# Patient Record
Sex: FEMALE | Race: BLACK OR AFRICAN AMERICAN | Employment: FULL TIME | URBAN - METROPOLITAN AREA
[De-identification: names, ages, dates, MRNs, and addresses within clinical notes are randomized per-mention and may not be internally consistent; named-entity substitution may affect disease eponyms.]

---

## 2019-03-22 ENCOUNTER — OFFICE VISIT (OUTPATIENT)
Dept: FAMILY MEDICINE CLINIC | Age: 59
End: 2019-03-22

## 2019-03-22 ENCOUNTER — HOSPITAL ENCOUNTER (OUTPATIENT)
Dept: LAB | Age: 59
Discharge: HOME | End: 2019-03-22

## 2019-03-22 VITALS
HEART RATE: 77 BPM | DIASTOLIC BLOOD PRESSURE: 84 MMHG | SYSTOLIC BLOOD PRESSURE: 144 MMHG | BODY MASS INDEX: 36.49 KG/M2 | HEIGHT: 65 IN | TEMPERATURE: 98.5 F | WEIGHT: 219 LBS

## 2019-03-22 DIAGNOSIS — E11.9 TYPE 2 DIABETES MELLITUS WITHOUT COMPLICATION, WITHOUT LONG-TERM CURRENT USE OF INSULIN (HCC): Primary | ICD-10-CM

## 2019-03-22 DIAGNOSIS — G56.03 BILATERAL CARPAL TUNNEL SYNDROME: ICD-10-CM

## 2019-03-22 DIAGNOSIS — I10 ESSENTIAL HYPERTENSION: ICD-10-CM

## 2019-03-22 DIAGNOSIS — Z13.9 ENCOUNTER FOR SCREENING: ICD-10-CM

## 2019-03-22 DIAGNOSIS — R07.9 CHEST PAIN OF UNCERTAIN ETIOLOGY: ICD-10-CM

## 2019-03-22 DIAGNOSIS — E11.9 TYPE 2 DIABETES MELLITUS WITHOUT COMPLICATION, WITHOUT LONG-TERM CURRENT USE OF INSULIN (HCC): ICD-10-CM

## 2019-03-22 PROBLEM — E66.01 SEVERE OBESITY (HCC): Status: ACTIVE | Noted: 2019-03-22

## 2019-03-22 LAB
ALBUMIN SERPL-MCNC: 3.8 G/DL (ref 3.5–5)
ALBUMIN/GLOB SERPL: 1.3 {RATIO} (ref 1.1–2.2)
ALP SERPL-CCNC: 141 U/L (ref 45–117)
ALT SERPL-CCNC: 22 U/L (ref 12–78)
ANION GAP SERPL CALC-SCNC: 7 MMOL/L (ref 5–15)
AST SERPL-CCNC: 11 U/L (ref 15–37)
BILIRUB SERPL-MCNC: 0.4 MG/DL (ref 0.2–1)
BUN SERPL-MCNC: 12 MG/DL (ref 6–20)
BUN/CREAT SERPL: 17 (ref 12–20)
CALCIUM SERPL-MCNC: 9.5 MG/DL (ref 8.5–10.1)
CHLORIDE SERPL-SCNC: 103 MMOL/L (ref 97–108)
CHOLEST SERPL-MCNC: 273 MG/DL
CO2 SERPL-SCNC: 27 MMOL/L (ref 21–32)
COMMENT, HOLDF: NORMAL
CREAT SERPL-MCNC: 0.71 MG/DL (ref 0.55–1.02)
CREAT UR-MCNC: 39.6 MG/DL
EST. AVERAGE GLUCOSE BLD GHB EST-MCNC: 192 MG/DL
GLOBULIN SER CALC-MCNC: 3 G/DL (ref 2–4)
GLUCOSE POC: NORMAL MG/DL
GLUCOSE SERPL-MCNC: 196 MG/DL (ref 65–100)
HBA1C MFR BLD: 8.3 % (ref 4.2–6.3)
HDLC SERPL-MCNC: 54 MG/DL
HDLC SERPL: 5.1 {RATIO} (ref 0–5)
LDLC SERPL CALC-MCNC: 185.6 MG/DL (ref 0–100)
LIPID PROFILE,FLP: ABNORMAL
MICROALBUMIN UR-MCNC: 4.3 MG/DL
MICROALBUMIN/CREAT UR-RTO: 109 MG/G (ref 0–30)
POTASSIUM SERPL-SCNC: 4.1 MMOL/L (ref 3.5–5.1)
PROT SERPL-MCNC: 6.8 G/DL (ref 6.4–8.2)
SAMPLES BEING HELD,HOLD: NORMAL
SODIUM SERPL-SCNC: 137 MMOL/L (ref 136–145)
TRIGL SERPL-MCNC: 167 MG/DL (ref ?–150)
VLDLC SERPL CALC-MCNC: 33.4 MG/DL

## 2019-03-22 PROCEDURE — 83036 HEMOGLOBIN GLYCOSYLATED A1C: CPT

## 2019-03-22 PROCEDURE — 80061 LIPID PANEL: CPT

## 2019-03-22 PROCEDURE — 80053 COMPREHEN METABOLIC PANEL: CPT

## 2019-03-22 PROCEDURE — 82043 UR ALBUMIN QUANTITATIVE: CPT

## 2019-03-22 RX ORDER — AMLODIPINE BESYLATE 10 MG/1
10 TABLET ORAL DAILY
Qty: 90 TAB | Refills: 1 | Status: SHIPPED | OUTPATIENT
Start: 2019-03-22 | End: 2019-07-10 | Stop reason: SDUPTHER

## 2019-03-22 RX ORDER — METFORMIN HYDROCHLORIDE 1000 MG/1
1000 TABLET ORAL 2 TIMES DAILY WITH MEALS
Qty: 180 TAB | Refills: 1 | Status: SHIPPED | OUTPATIENT
Start: 2019-03-22 | End: 2019-07-10 | Stop reason: SDUPTHER

## 2019-03-22 NOTE — PROGRESS NOTES
Results for orders placed or performed in visit on 03/22/19 AMB POC GLUCOSE BLOOD, BY GLUCOSE MONITORING DEVICE Result Value Ref Range Glucose POC f 228 mg/dL Coordination of Care 1. Have you been to the ER, urgent care clinic since your last visit? Hospitalized since your last visit? No 
 
2. Have you seen or consulted any other health care providers outside of the [address] [place] [address] since your last visit? Include any pap smears or colon screening. No 
 
Does the patient need refills? YES Learning Assessment Complete? yes Depression Screening complete in the past 12 months? yes

## 2019-03-22 NOTE — LETTER
NOTIFICATION RETURN TO WORK / SCHOOL 
 
3/22/2019 12:37 PM 
 
Ms. Amberly Patton [address] [facility] [address] [id number] To Whom It May Concern: 
 
Amberly Patton is currently under the care of [facility] She will return to work/school on:03/22/2019 If there are questions or concerns please have the patient contact our office.  
 
 
 
Sincerely, 
 
 
Mamie Ford MD

## 2019-03-23 NOTE — PROGRESS NOTES
Note to pt on Mychart and will contact her re possible stress imaging. She will call for appt. With dietitian. Recommended restarting statin and ARB at next visit.

## 2019-03-23 NOTE — PROGRESS NOTES
Subjective: Chief Complaint Patient presents with  Medication Refill  Other Numbness & tingling on both hands more on right. she is a 66y.o. year old female who presents for evalution. Went off all meds ~a year ago, thought she could control dm and htn without meds. Had lost quite a bit of weight, but gained it all back after several close family members . Had 1 physical during that time for work, and sounds like she was put on amlodipine 10mg daily then, and is currently taking that. Realizes she needs to restart metformin, but would rather not take a statin at this time, and doesn't want to restart the losarten/hct yet. Generally feeling pretty well, no exercise. Ros + for arms/hands falling asleep at night, with pain in hands running up arms. No feet sx. Reports 1 day of sscp starting while lifting things at work. The pain continued the entire day, was not asst with sob, diaphoresis, n/v, radiation. She stayed and worked that day. H/o very high lipids, nonsmoker, second degree relative with early CAD. Past Medical History:  
Diagnosis Date  Dermatomycosis, unspecified 2013  Hypercholesterolemia 2013  Hypertension 2013  Low back pain 2013  Morbid obesity ([facility]  Type 2 diabetes mellitus without complication ([facility]  Objective:  
 
Vitals:  
 19 0932 BP: 144/84 Pulse: 77 Temp: 98.5 °F (36.9 °C) TempSrc: Oral  
Weight: 219 lb (99.3 kg) Height: 5' 4.57\" (1.64 m) Physical Examination: General appearance - alert, well appearing, and in no distress and overweight Neck - supple, no significant adenopathy, thyroid exam: thyroid is normal in size without nodules or tenderness. No carotid bruits. Chest - clear to auscultation, no wheezes, rales or rhonchi, symmetric air entry, no chest wall tenderness. Heart - normal rate, regular rhythm, normal S1, S2, no murmurs, rubs, clicks or gallops Abdomen - soft, nontender, nondistended, no masses or organomegaly Extremities - no pedal edema noted, + NCT bilat wrists, nl thenar eminences. ekg nl. Assessment/ Plan:  
T2D, uncontrolled, htn not quite at goal, hyperlipidemia. Start metformin slowly and get up to full dose within a few weeks. Continue amlodipine, check labs. Needs RD discussion, which was not scheduled. F/u 6-8 weeks. SSCP with multiple risk factors, not typical angina. The pt did not return to my exam room to discuss results of ekg and next plan. I will recommend stress imaging, will call with lab results and to discuss recs over the next few days. bilat CTS. Start with cock-up splint, recheck at next visit. Wt. Loss and glucose control may help. May need repeat tsh. Results for orders placed or performed during the hospital encounter of 03/22/19 HEMOGLOBIN A1C WITH EAG Result Value Ref Range Hemoglobin A1c 8.3 (H) 4.2 - 6.3 % Est. average glucose 192 mg/dL LIPID PANEL Result Value Ref Range LIPID PROFILE Cholesterol, total 273 (H) <200 MG/DL Triglyceride 167 (H) <150 MG/DL  
 HDL Cholesterol 54 MG/DL  
 LDL, calculated 185.6 (H) 0 - 100 MG/DL VLDL, calculated 33.4 MG/DL  
 CHOL/HDL Ratio 5.1 (H) 0.0 - 5.0 MICROALBUMIN, UR, RAND W/ MICROALB/CREAT RATIO Result Value Ref Range Microalbumin,urine random 4.30 MG/DL Creatinine, urine 39.60 mg/dL Microalbumin/Creat ratio (mg/g creat) 109 (H) 0 - 30 mg/g METABOLIC PANEL, COMPREHENSIVE Result Value Ref Range Sodium 137 136 - 145 mmol/L Potassium 4.1 3.5 - 5.1 mmol/L Chloride 103 97 - 108 mmol/L  
 CO2 27 21 - 32 mmol/L Anion gap 7 5 - 15 mmol/L Glucose 196 (H) 65 - 100 mg/dL BUN 12 6 - 20 MG/DL Creatinine 0.71 0.55 - 1.02 MG/DL  
 BUN/Creatinine ratio 17 12 - 20 GFR est AA >60 >60 ml/min/1.73m2 GFR est non-AA >60 >60 ml/min/1.73m2 Calcium 9.5 8.5 - 10.1 MG/DL  Bilirubin, total 0.4 0.2 - 1.0 MG/DL  
 ALT (SGPT) 22 12 - 78 U/L  
 AST (SGOT) 11 (L) 15 - 37 U/L Alk. phosphatase 141 (H) 45 - 117 U/L Protein, total 6.8 6.4 - 8.2 g/dL Albumin 3.8 3.5 - 5.0 g/dL Globulin 3.0 2.0 - 4.0 g/dL A-G Ratio 1.3 1.1 - 2.2 SAMPLES BEING HELD Result Value Ref Range SAMPLES BEING HELD 1LAV,1SST   
 COMMENT Add-on orders for these samples will be processed based on acceptable specimen integrity and analyte stability, which may vary by analyte. Results for orders placed or performed in visit on 03/22/19 AMB POC GLUCOSE BLOOD, BY GLUCOSE MONITORING DEVICE Result Value Ref Range Glucose POC f 228 mg/dL Orders Placed This Encounter  HEMOGLOBIN A1C WITH EAG Standing Status:   Future Number of Occurrences:   1 Standing Expiration Date:   9/22/2019  LIPID PANEL Standing Status:   Future Number of Occurrences:   1 Standing Expiration Date:   9/22/2019  MICROALBUMIN, UR, RAND W/ MICROALB/CREAT RATIO Standing Status:   Future Number of Occurrences:   1 Standing Expiration Date:   9/19/2019  METABOLIC PANEL, COMPREHENSIVE Standing Status:   Future Number of Occurrences:   1 Standing Expiration Date:   9/22/2019  AMB POC GLUCOSE BLOOD, BY GLUCOSE MONITORING DEVICE  EKG, 12 LEAD, INITIAL Standing Status:   Future Standing Expiration Date:   9/22/2019 Order Specific Question:   Reason for Exam: Answer:   sscp  AMB POC EKG ROUTINE W/ 12 LEADS, INTER & REP Standing Status:   Future Standing Expiration Date:   9/22/2019 Order Specific Question:   Reason for Exam: Answer:   encounter for screening  AMB POC EKG ROUTINE W/ 12 LEADS, INTER & REP Order Specific Question:   Reason for Exam: Answer:   sscp  metFORMIN (GLUCOPHAGE) 1,000 mg tablet Sig: Take 1 Tab by mouth two (2) times daily (with meals). Dispense:  180 Tab Refill:  1  
 amLODIPine (NORVASC) 10 mg tablet Sig: Take 1 Tab by mouth daily. Dispense:  90 Tab Refill:  1 Follow-up and Dispositions · Return for 6-8 wk with me if possible.

## 2019-03-23 NOTE — PROGRESS NOTES
Lm to pt that ekg was normal but still may need stress imaging. Will contact her again in the next few days and I'm leaving a message on [place].

## 2019-03-26 DIAGNOSIS — R07.9 CHEST PAIN, UNSPECIFIED TYPE: Primary | ICD-10-CM

## 2019-03-26 NOTE — PROGRESS NOTES
Discussed all results with her, hadn't gotten [employer] message. She has had exertional cp, neck pain, left arm pain and so will schedule stress imaging. Doesn't think she needs to see dietitian to get back on diet. Wait to start exercise until we know stress imaging is neg. Hap appt. In June, will need repeat microalb and/or restart losarten at that time, recheck lipids with consideration of statin or other lipid med.

## 2019-03-27 ENCOUNTER — TELEPHONE (OUTPATIENT)
Dept: FAMILY MEDICINE CLINIC | Age: 59
End: 2019-03-27

## 2019-03-27 NOTE — TELEPHONE ENCOUNTER
AVS printed and placed in mail w/ how to reattach to My Chart included. Call to Central Scheduling and they are unable to schedule lexiscan. Will research issue for scheduling. Pt is available next Wed 4/3/19 or any day the following week 4/8 to 4/12.

## 2019-03-27 NOTE — TELEPHONE ENCOUNTER
Discussed w/ Dr Renetta Ruiz. [facility] to [facility]. Scheduled stress test/ lexiscan for Wed 4/3/19 at 9:00a w/ arrival 8:30a to 8:45a. NPO past mn day of test. Hold cardiac meds /beta blockers day of test.   Chart review show pt diabetic and on Metformin.  Instructed to check sugar, take a snack for after test and hold diabetes meds on day of test.  Pt given information and agrees to day of test.

## 2019-03-27 NOTE — TELEPHONE ENCOUNTER
----- Message from Sukhjinder Palacios MD sent at 3/26/2019  4:26 PM EDT -----  Regarding: needs stress test soon  Can you schedule her for lexiscan at [facility]? Also, she needs to get back into MyChart, didn't get avs last visit, left without finishing visit. Can you send this to her or send her info on how to get back into [employer]? Thanks.   Quinn Larry

## 2019-04-05 NOTE — TELEPHONE ENCOUNTER
[facility] called and I spoke with Shy Donahue who central scheduling said canceled test. She said pt came and Enrico Leonardo received a message that \"pt's  card showed insufficient funds\" so she were told to cancel. She did not know what kind of card this was referencing. I tried calling pt and LM asking her to call back to speak with a nurse.  Mirta Aguirre RN

## 2019-04-08 ENCOUNTER — TELEPHONE (OUTPATIENT)
Dept: FAMILY MEDICINE CLINIC | Age: 59
End: 2019-04-08

## 2019-04-08 NOTE — TELEPHONE ENCOUNTER
HPV # 1 injection given. Verbal order for injection from Dr Ochoa Patient tolerated without incident. See MAR for documentation.     Patient, Dejuan Espinosa, left message that she wanted to speak with a nurse regarding some tests that she had to cancel.     Erica Manriquez

## 2019-04-08 NOTE — TELEPHONE ENCOUNTER
Tc to the pt's home #. No answer. A message was left to contact the [facility] office. Nick Dowling RN    Tc to the pt's mobile number listed. A message that \"all circuits were busy\" came on the line. No opportunity to leave a message.  Nick Dowling RN

## 2019-04-08 NOTE — LETTER
4/12/2019 4:13 PM 
 
Ms. Cecily Bass [address] [facility] [address] [address] Dear Nani Bhupendra: After you have had an opportunity to speak with the [facility], please call the [facility] office, [phone],  if you decide you would like to have the stress tests rescheduled. Please call if you have any additional questions. Sincerely, 
 
 
Chavez Garcia for Dr Brandi Moseley

## 2019-04-09 NOTE — TELEPHONE ENCOUNTER
I also LM for pt to call me. I looked into the cancelled stress imaging, which states cancelled by provider but was not cancelled by me.   According to registration, she was told the self-pay cost of the stress imaging was almost $9000 and was offered an application for the care card, but the pt stated she didn't think she would qualify, so she cancelled the test.

## 2019-04-10 NOTE — TELEPHONE ENCOUNTER
Patient, Sophy Chin, said she had missed our call. She canceled her appointment for tests because she would have had to sign a paper saying she would pay the full amount of $8,000. She said she knows she would not qualify for assistance because her salary is above the maximum amount you can make. Please call her on her cell, [phone] or work [phone].

## 2019-04-11 NOTE — TELEPHONE ENCOUNTER
RN returned call to pt who advised that when she arrived to have the stress done at [facility], she asked about the price and was told the test was a 4 part test, and the total cost would be $13,400. She did not feel comfortable taking on that amount of debt. She was also told that if her income was over 200% of the poverty level, that she would be responsible for payment of the test.  RN advised pt that [facility] has a financial assistance program that is for people who are not eligible for medical insurance through a government program.   RN advised pt to call the [place] [address] assistance number for hospital accounts, [phone], to clarify what her discount percentage would be based on her income and family size. She will do so today. RN will mail pt the information sheet regarding \"Applying for Financial Assistance with [facility] and Medical Groups\". Pt stated that she last had chest pain 2 weeks ago that lasted about 2 hours. She is not having any today.   Sandra Canales RN

## 2019-04-12 NOTE — TELEPHONE ENCOUNTER
RN called pt twice, left message that if she decides to have stress tests done, she should call the [facility] office to have the tests rescheduled. RN will also send letter stating this.   Rolando Cooney RN

## 2019-06-10 ENCOUNTER — OFFICE VISIT (OUTPATIENT)
Dept: FAMILY MEDICINE CLINIC | Age: 59
End: 2019-06-10

## 2019-06-10 VITALS
BODY MASS INDEX: 36.65 KG/M2 | HEART RATE: 98 BPM | HEIGHT: 65 IN | SYSTOLIC BLOOD PRESSURE: 130 MMHG | TEMPERATURE: 98.4 F | WEIGHT: 220 LBS | DIASTOLIC BLOOD PRESSURE: 79 MMHG

## 2019-06-10 DIAGNOSIS — E66.01 SEVERE OBESITY (HCC): ICD-10-CM

## 2019-06-10 DIAGNOSIS — E11.9 TYPE 2 DIABETES MELLITUS WITHOUT COMPLICATION, WITHOUT LONG-TERM CURRENT USE OF INSULIN (HCC): Primary | ICD-10-CM

## 2019-06-10 DIAGNOSIS — E11.21 TYPE 2 DIABETES WITH NEPHROPATHY (HCC): ICD-10-CM

## 2019-06-10 LAB — GLUCOSE POC: NORMAL MG/DL

## 2019-06-10 NOTE — PROGRESS NOTES
Results for orders placed or performed in visit on 06/10/19   AMB POC GLUCOSE BLOOD, BY GLUCOSE MONITORING DEVICE   Result Value Ref Range    Glucose POC nf 116 mg/dL

## 2019-06-10 NOTE — PROGRESS NOTES
Assessment/Plan:       ICD-10-CM ICD-9-CM    1. Type 2 diabetes mellitus without complication, without long-term current use of insulin (HCC) E11.9 250.00 AMB POC GLUCOSE BLOOD, BY GLUCOSE MONITORING DEVICE      MICROALBUMIN, UR, RAND W/ MICROALB/CREAT RATIO      LIPID PANEL      HEMOGLOBIN A1C WITH EAG   2. Type 2 diabetes with nephropathy (HCC) E11.21 250.40      583.81    3. Severe obesity ([address] E66.01 278.01       Follow-up and Dispositions    · Return in about 3 weeks (around 7/1/2019) for 3 wks labs, end of September for Diabetes, HTN, cholesterol. Plan to send in medicine for cholesterol. Also, if microalbumin/creatinine is still up, to add losartan. [address] [place] [address]  Phone: [phone] Fax: [phone]    [facility]  [address] [place] [address]  Phone: [phone] Fax: [phone]      [facility]  Subjective:     Chief Complaint   Patient presents with    Diabetes     f/u    Clotilde Cruz is a 66 y.o. BLACK OR  female who speaks Makeda Bride. The left wrist doesn't tolerate the wrist band. Getting ready to do green smoothies. Baby aspirin. Testing was $8000 for the heart. She was over the threshold. Wants to wait until January next year. HPI: States that she is no loner having chest pain. ROS:   No increased frequency of urination, no increased thirst; no chest pain, dyspnea or TIA's; no numbness, tingling or pain in extremities; no reports of hypoglycemia. Diabetes   Brought in medications? no  Last took medications: today Taking medications as prescribed? yes  Last ate:today  Working: yes  Physical Activity? no  Measuring glucoses? no    Medications:    Current Outpatient Medications:     metFORMIN (GLUCOPHAGE) 1,000 mg tablet, Take 1 Tab by mouth two (2) times daily (with meals). , Disp: 180 Tab, Rfl: 1    amLODIPine (NORVASC) 10 mg tablet, Take 1 Tab by mouth daily. , Disp: 90 Tab, Rfl: 1    multivitamin (ONE A DAY) tablet, Take 1 Tab by mouth daily. , Disp: 90 Tab, Rfl: 3    omega-3 fatty acids-fish oil (FISH OIL) 360-1,200 mg cap, Take 1 Cap by mouth daily. , Disp: 90 Cap, Rfl: 3    triamcinolone acetonide (KENALOG) 0.1 % ointment, Apply  to affected area two (2) times a day. use thin layer, do not apply to your face., Disp: 454 g, Rfl: 2    Blood-Glucose Meter monitoring kit, For once daily blood glucose monitoring, Disp: 1 Kit, Rfl: 0    Lancets misc, For once daily blood glucose monitoring, Disp: 1 Package, Rfl: 11    glucose blood VI test strips (FIFTY50 TEST STRIP) strip, For once daily blood glucose monitoring, Disp: 1 Package, Rfl: 11  Social History: She reports that she quit smoking about 36 years ago. She has never used smokeless tobacco. She reports that she drinks alcohol. She reports that she does not use drugs. Family History: Her family history includes Breast Cancer in her mother; Cancer (age of onset: 68) in her mother; Diabetes in an other family member; Hypertension in an other family member. Objective:     Vitals:    06/10/19 1508   BP: 130/79   Pulse: 98   Temp: 98.4 °F (36.9 °C)   TempSrc: Oral   Weight: 220 lb (99.8 kg)   Height: 5' 4.57\" (1.64 m)    No LMP recorded. (Menstrual status: Menopause). Results for orders placed or performed in visit on 06/10/19   AMB POC GLUCOSE BLOOD, BY GLUCOSE MONITORING DEVICE   Result Value Ref Range    Glucose POC nf 116 mg/dL      Wt Readings from Last 2 Encounters:   06/10/19 220 lb (99.8 kg)   03/22/19 219 lb (99.3 kg)    Weight increased;    Hemoglobin A1c   Date Value Ref Range Status   03/22/2019 8.3 (H) 4.2 - 6.3 % Final   12/20/2016 7.1 (H) 4.8 - 5.6 % Final     Comment:              Pre-diabetes: 5.7 - 6.4           Diabetes: >6.4           Glycemic control for adults with diabetes: <7.0      A1C increased;   Lab Results   Component Value Date/Time Microalbumin/Creat ratio (mg/g creat) 109 (H) 03/22/2019 12:23 PM    Microalbumin,urine random 4.30 03/22/2019 12:23 PM    Creatinine 0.71 03/22/2019 12:23 PM      Lab Results   Component Value Date/Time    GFR est AA >60 03/22/2019 12:23 PM    GFR est non-AA >60 03/22/2019 12:23 PM       Lab Results   Component Value Date/Time    Cholesterol, total 273 (H) 03/22/2019 12:23 PM    HDL Cholesterol 54 03/22/2019 12:23 PM    LDL, calculated 185.6 (H) 03/22/2019 12:23 PM    Triglyceride 167 (H) 03/22/2019 12:23 PM    CHOL/HDL Ratio 5.1 (H) 03/22/2019 12:23 PM      Lab Results   Component Value Date/Time    ALT (SGPT) 22 03/22/2019 12:23 PM    AST (SGOT) 11 (L) 03/22/2019 12:23 PM    Alk. phosphatase 141 (H) 03/22/2019 12:23 PM    Bilirubin, total 0.4 03/22/2019 12:23 PM     Constitutional: She appears well-developed. Eyes: EOM are normal. Pupils are equal, round, and reactive to light. Neck: Neck supple. No thyromegaly present. Cardiovascular: Normal rate, regular rhythm, normal heart sounds and intact distal pulses. No murmur heard. Pulmonary/Chest: Effort normal and breath sounds normal.   Musculoskeletal: She exhibits no edema. No ulcers of the lower extremities. Assessment/Plan:   Diagnoses and all orders for this visit:    1. Type 2 diabetes mellitus without complication, without long-term current use of insulin ([facility])  -     AMB POC GLUCOSE BLOOD, BY GLUCOSE MONITORING DEVICE  -     MICROALBUMIN, UR, RAND W/ MICROALB/CREAT RATIO; Future  -     LIPID PANEL; Future  -     HEMOGLOBIN A1C WITH EAG; Future    2. Type 2 diabetes with nephropathy ([address]    3. Severe obesity ([facility]      Diabetes Mellitus type 2, under uncertain control. Blood pressure under good control. Follow-up and Dispositions    · Return in about 3 weeks (around 7/1/2019) for 3 wks labs, end of September for Diabetes, HTN, cholesterol. Plan to send in medicine for cholesterol.   Also, if microalbumin/creatinine is still up, to add losartan. Isaias Mcelroy, HERBERT, FNP-BC, BC-ADM  Jessica Rayo expressed understanding of this plan.

## 2019-07-08 ENCOUNTER — HOSPITAL ENCOUNTER (OUTPATIENT)
Dept: LAB | Age: 59
Discharge: HOME | End: 2019-07-08

## 2019-07-08 ENCOUNTER — LAB ONLY (OUTPATIENT)
Dept: FAMILY MEDICINE CLINIC | Age: 59
End: 2019-07-08

## 2019-07-08 DIAGNOSIS — E11.9 TYPE 2 DIABETES MELLITUS WITHOUT COMPLICATION, WITHOUT LONG-TERM CURRENT USE OF INSULIN (HCC): ICD-10-CM

## 2019-07-08 LAB
CHOLEST SERPL-MCNC: 219 MG/DL
EST. AVERAGE GLUCOSE BLD GHB EST-MCNC: 183 MG/DL
HBA1C MFR BLD: 8 % (ref 4.2–6.3)
HDLC SERPL-MCNC: 49 MG/DL
HDLC SERPL: 4.5 {RATIO} (ref 0–5)
LDLC SERPL CALC-MCNC: 140 MG/DL (ref 0–100)
LIPID PROFILE,FLP: ABNORMAL
TRIGL SERPL-MCNC: 150 MG/DL (ref ?–150)
VLDLC SERPL CALC-MCNC: 30 MG/DL

## 2019-07-08 PROCEDURE — 82043 UR ALBUMIN QUANTITATIVE: CPT

## 2019-07-08 PROCEDURE — 83036 HEMOGLOBIN GLYCOSYLATED A1C: CPT

## 2019-07-08 PROCEDURE — 80061 LIPID PANEL: CPT

## 2019-07-08 NOTE — PROGRESS NOTES
Patient came in today for a fasting lab only visit per Travis Negrete NP. A1C and Lipid were drawn from her L Hand without complications. [place] Nico was collected for a Microalbumin. Results pending.

## 2019-07-09 LAB
CREAT UR-MCNC: 108 MG/DL
MICROALBUMIN UR-MCNC: 5.02 MG/DL
MICROALBUMIN/CREAT UR-RTO: 46 MG/G (ref 0–30)

## 2019-07-10 ENCOUNTER — TELEPHONE (OUTPATIENT)
Dept: FAMILY MEDICINE CLINIC | Age: 59
End: 2019-07-10

## 2019-07-10 DIAGNOSIS — E78.49 OTHER HYPERLIPIDEMIA: ICD-10-CM

## 2019-07-10 DIAGNOSIS — I10 ESSENTIAL HYPERTENSION: ICD-10-CM

## 2019-07-10 DIAGNOSIS — E11.9 TYPE 2 DIABETES MELLITUS WITHOUT COMPLICATION, WITHOUT LONG-TERM CURRENT USE OF INSULIN (HCC): Primary | ICD-10-CM

## 2019-07-10 RX ORDER — AMLODIPINE BESYLATE 10 MG/1
10 TABLET ORAL DAILY
Qty: 90 TAB | Refills: 1 | Status: SHIPPED | OUTPATIENT
Start: 2019-07-10 | End: 2019-11-20 | Stop reason: SDUPTHER

## 2019-07-10 RX ORDER — GLIPIZIDE 5 MG/1
TABLET ORAL
Qty: 90 TAB | Refills: 1 | Status: SHIPPED | OUTPATIENT
Start: 2019-07-10 | End: 2019-09-09

## 2019-07-10 RX ORDER — METFORMIN HYDROCHLORIDE 1000 MG/1
1000 TABLET ORAL 2 TIMES DAILY WITH MEALS
Qty: 180 TAB | Refills: 1 | Status: SHIPPED | OUTPATIENT
Start: 2019-07-10 | End: 2019-11-20 | Stop reason: SDUPTHER

## 2019-07-10 RX ORDER — ATORVASTATIN CALCIUM 20 MG/1
20 TABLET, FILM COATED ORAL DAILY
Qty: 90 TAB | Refills: 1 | Status: SHIPPED | OUTPATIENT
Start: 2019-07-10 | End: 2019-11-20 | Stop reason: SDUPTHER

## 2019-07-10 NOTE — PROGRESS NOTES
I sent in atorvastatin 20mg for cholesterol, metformin 1000 mg twice a day, amlodipine 10mg daily, and glipizide 5mg, take 30 minutes before dinner (no matter how late dinner is - your glucose will often run higher when you eat late, so it is especially important to take it when you eat late).

## 2019-07-10 NOTE — PROGRESS NOTES
Next visit scheduled for 9/9/19. Medication adjustments were made over the phone on 7/10/19. New ones in bold. Current Outpatient Medications   Medication Sig Dispense Refill    metFORMIN (GLUCOPHAGE) 1,000 mg tablet Take 1 Tab by mouth two (2) times daily (with meals). 180 Tab 1    amLODIPine (NORVASC) 10 mg tablet Take 1 Tab by mouth daily. 90 Tab 1    atorvastatin (LIPITOR) 20 mg tablet Take 1 Tab by mouth daily. For cholesterol 90 Tab 1    glipiZIDE (GLUCOTROL) 5 mg tablet 1 po qday 30 min ac dinner 90 Tab 1     Around early October, recheck fasting lipids, urine microalbumin, A1c. This will be after her next visit on 9/9/19. Labs have been ordered, see below. If the next visit is a well visit, can focus on Health Maintenance Items that are due. Diagnoses and all orders for this visit:    1. Type 2 diabetes mellitus without complication, without long-term current use of insulin (HCC)  -     metFORMIN (GLUCOPHAGE) 1,000 mg tablet; Take 1 Tab by mouth two (2) times daily (with meals). -     glipiZIDE (GLUCOTROL) 5 mg tablet; 1 po qday 30 min ac dinner  -     MICROALBUMIN, UR, RAND W/ MICROALB/CREAT RATIO; Future  -     HEMOGLOBIN A1C WITH EAG; Future    2. Essential hypertension  -     amLODIPine (NORVASC) 10 mg tablet; Take 1 Tab by mouth daily. 3. Other hyperlipidemia  -     atorvastatin (LIPITOR) 20 mg tablet; Take 1 Tab by mouth daily. For cholesterol  -     LIPID PANEL;  Future

## 2019-09-09 ENCOUNTER — OFFICE VISIT (OUTPATIENT)
Dept: FAMILY MEDICINE CLINIC | Age: 59
End: 2019-09-09

## 2019-09-09 VITALS
DIASTOLIC BLOOD PRESSURE: 83 MMHG | TEMPERATURE: 98.2 F | SYSTOLIC BLOOD PRESSURE: 142 MMHG | BODY MASS INDEX: 36.99 KG/M2 | HEART RATE: 85 BPM | WEIGHT: 222 LBS | HEIGHT: 65 IN

## 2019-09-09 DIAGNOSIS — E11.21 TYPE 2 DIABETES WITH NEPHROPATHY (HCC): Primary | ICD-10-CM

## 2019-09-09 LAB — GLUCOSE POC: NORMAL MG/DL

## 2019-09-09 RX ORDER — GLIMEPIRIDE 1 MG/1
1 TABLET ORAL
Qty: 90 TAB | Refills: 1 | Status: SHIPPED | OUTPATIENT
Start: 2019-09-09 | End: 2019-11-20 | Stop reason: SDUPTHER

## 2019-09-09 NOTE — PATIENT INSTRUCTIONS
Shelbi Piermont  Hemoglobin A1c   Date Value Ref Range Status   07/08/2019 8.0 (H) 4.2 - 6.3 % Final   03/22/2019 8.3 (H) 4.2 - 6.3 % Final   12/20/2016 7.1 (H) 4.8 - 5.6 % Final     Comment:              Pre-diabetes: 5.7 - 6.4           Diabetes: >6.4           Glycemic control for adults with diabetes: <7.0     03/31/2016 7.5 (H) 4.8 - 5.6 % Final     Comment:              Pre-diabetes: 5.7 - 6.4           Diabetes: >6.4           Glycemic control for adults with diabetes: <7.0     10/07/2015 6.9 (H) 4.8 - 5.6 % Final     Comment:              Increased risk for diabetes: 5.7 - 6.4           Diabetes: >6.4           Glycemic control for adults with diabetes: <7.0     02/03/2015 7.5 (H) 4.2 - 6.3 % Final

## 2019-09-09 NOTE — PROGRESS NOTES
Reviewed AVS, prescription and pharmacy location with patient. AVS printed and given. This has been fully explained to the patient, who indicates understanding and agrees with plan. No further questions at this time.  Michaela Santoyo RN

## 2019-09-09 NOTE — PROGRESS NOTES
Coordination of Care  1. Have you been to the ER, urgent care clinic since your last visit? Hospitalized since your last visit? No    2. Have you seen or consulted any other health care providers outside of the [address] [place] [address] since your last visit? Include any pap smears or colon screening. No    Does the patient need refills? YES    Learning Assessment Complete?  yes  Depression Screening complete in the past 12 months? yes    Results for orders placed or performed in visit on 09/09/19   AMB POC GLUCOSE BLOOD, BY GLUCOSE MONITORING DEVICE   Result Value Ref Range    Glucose POC nf 256 mg/dL

## 2019-09-09 NOTE — PROGRESS NOTES
Assessment/Plan:       ICD-10-CM ICD-9-CM    1. Type 2 diabetes with nephropathy (HCC) E11.21 250.40 AMB POC GLUCOSE BLOOD, BY GLUCOSE MONITORING DEVICE     583.81     Cholesterol medication - takes in the morning  It seems that she has insurance and will follow up with a new PCP. [address] [address] [place] [id number]  Phone: [phone] Fax: [phone]    [address] [place] [address]  Phone: [phone] Fax: [phone]      [facility]  Subjective:     Chief Complaint   Patient presents with    Diabetes     f/u    Delvin Quijano is a 66 y.o. BLACK OR  female who speaks [place].  used? no   HPI: 8 to 9 am first meal.  Plan to send in medicine for cholesterol. Also, if microalbumin/creatinine is still up, to add losartan. ROS:   No increased frequency of urination, no increased thirst; no chest pain, dyspnea or TIA's; no numbness, tingling or pain in extremities; no reports of hypoglycemia. Medications:    Current Outpatient Medications:     metFORMIN (GLUCOPHAGE) 1,000 mg tablet, Take 1 Tab by mouth two (2) times daily (with meals). , Disp: 180 Tab, Rfl: 1    amLODIPine (NORVASC) 10 mg tablet, Take 1 Tab by mouth daily. , Disp: 90 Tab, Rfl: 1    atorvastatin (LIPITOR) 20 mg tablet, Take 1 Tab by mouth daily. For cholesterol, Disp: 90 Tab, Rfl: 1    glipiZIDE (GLUCOTROL) 5 mg tablet, 1 po qday 30 min ac dinner, Disp: 90 Tab, Rfl: 1    multivitamin (ONE A DAY) tablet, Take 1 Tab by mouth daily. , Disp: 90 Tab, Rfl: 3    omega-3 fatty acids-fish oil (FISH OIL) 360-1,200 mg cap, Take 1 Cap by mouth daily. , Disp: 90 Cap, Rfl: 3    triamcinolone acetonide (KENALOG) 0.1 % ointment, Apply  to affected area two (2) times a day.  use thin layer, do not apply to your face., Disp: 454 g, Rfl: 2    Blood-Glucose Meter monitoring kit, For once daily blood glucose monitoring, Disp: 1 Kit, Rfl: 0    Lancets misc, For once daily blood glucose monitoring, Disp: 1 Package, Rfl: 11    glucose blood VI test strips (FIFTY50 TEST STRIP) strip, For once daily blood glucose monitoring, Disp: 1 Package, Rfl: 11  Social History: She reports that she quit smoking about 36 years ago. She has never used smokeless tobacco. She reports that she drinks alcohol. She reports that she does not use drugs. Family History: Her family history includes Breast Cancer in her mother; Cancer (age of onset: 68) in her mother; Diabetes in an other family member; Hypertension in an other family member. Objective:     Vitals:    09/09/19 1511   BP: 142/83   Pulse: 85   Temp: 98.2 °F (36.8 °C)   TempSrc: Oral   Weight: 222 lb (100.7 kg)   Height: 5' 4.57\" (1.64 m)    No LMP recorded. (Menstrual status: Menopause). Results for orders placed or performed in visit on 09/09/19   AMB POC GLUCOSE BLOOD, BY GLUCOSE MONITORING DEVICE   Result Value Ref Range    Glucose POC nf 256 mg/dL      Wt Readings from Last 2 Encounters:   09/09/19 222 lb (100.7 kg)   06/10/19 220 lb (99.8 kg)    Weight increased;    Hemoglobin A1c   Date Value Ref Range Status   07/08/2019 8.0 (H) 4.2 - 6.3 % Final   03/22/2019 8.3 (H) 4.2 - 6.3 % Final    A1C decreased;   Lab Results   Component Value Date/Time    Microalbumin/Creat ratio (mg/g creat) 46 (H) 07/08/2019 09:19 AM    Microalbumin,urine random 5.02 07/08/2019 09:19 AM    Creatinine 0.71 03/22/2019 12:23 PM      Lab Results   Component Value Date/Time    GFR est AA >60 03/22/2019 12:23 PM    GFR est non-AA >60 03/22/2019 12:23 PM       Lab Results   Component Value Date/Time    Cholesterol, total 219 (H) 07/08/2019 09:19 AM    HDL Cholesterol 49 07/08/2019 09:19 AM    LDL, calculated 140 (H) 07/08/2019 09:19 AM    Triglyceride 150 (H) 07/08/2019 09:19 AM    CHOL/HDL Ratio 4.5 07/08/2019 09:19 AM      Lab Results   Component Value Date/Time    ALT (SGPT) 22 03/22/2019 12:23 PM    AST (SGOT) 11 (L) 03/22/2019 12:23 PM    Alk. phosphatase 141 (H) 03/22/2019 12:23 PM    Bilirubin, total 0.4 03/22/2019 12:23 PM     Constitutional: She appears well-developed. Eyes: EOM are normal. Pupils are equal, round, and reactive to light. Neck: Neck supple. No thyromegaly present. Cardiovascular: Normal rate, regular rhythm, normal heart sounds and intact distal pulses. No murmur heard. Pulmonary/Chest: Effort normal and breath sounds normal.   Musculoskeletal: She exhibits no edema. No ulcers of the lower extremities. Assessment/Plan:   Diagnoses and all orders for this visit:    1. Type 2 diabetes with nephropathy (HCC)  -     AMB POC GLUCOSE BLOOD, BY GLUCOSE MONITORING DEVICE      Diabetes Mellitus type 2, under Poor control. Blood pressure under Fair control. Greater than 50% of this 25 minute visit was spent in face-to-face counseling/coordination of care regarding diabetes management. Jossie Gunderson DNP, FNP-BC, BC-ADM  Steven Cheney expressed understanding of this plan.

## 2019-10-14 ENCOUNTER — HOSPITAL ENCOUNTER (OUTPATIENT)
Dept: LAB | Age: 59
Discharge: HOME | End: 2019-10-14

## 2019-10-14 ENCOUNTER — LAB ONLY (OUTPATIENT)
Dept: FAMILY MEDICINE CLINIC | Age: 59
End: 2019-10-14

## 2019-10-14 DIAGNOSIS — E78.49 OTHER HYPERLIPIDEMIA: ICD-10-CM

## 2019-10-14 DIAGNOSIS — E11.9 TYPE 2 DIABETES MELLITUS WITHOUT COMPLICATION, WITHOUT LONG-TERM CURRENT USE OF INSULIN (HCC): ICD-10-CM

## 2019-10-14 PROCEDURE — 80061 LIPID PANEL: CPT

## 2019-10-14 PROCEDURE — 81001 URINALYSIS AUTO W/SCOPE: CPT

## 2019-10-14 PROCEDURE — 83036 HEMOGLOBIN GLYCOSYLATED A1C: CPT

## 2019-10-14 NOTE — PROGRESS NOTES
Patient came into the clinic for a lab only visit per Rush Chand NP. Fasting Lipid and A1C were drawn from [facility] without complications. A urin sample was given for a Microalbumin. Results pending.

## 2019-10-15 LAB
APPEARANCE UR: CLEAR
BACTERIA URNS QL MICRO: NEGATIVE /HPF
BILIRUB UR QL: NEGATIVE
CHOLEST SERPL-MCNC: 153 MG/DL
COLOR UR: ABNORMAL
EPITH CASTS URNS QL MICRO: ABNORMAL /LPF
EST. AVERAGE GLUCOSE BLD GHB EST-MCNC: 212 MG/DL
GLUCOSE UR STRIP.AUTO-MCNC: NEGATIVE MG/DL
HBA1C MFR BLD: 9 % (ref 4.2–6.3)
HDLC SERPL-MCNC: 47 MG/DL
HDLC SERPL: 3.3 {RATIO} (ref 0–5)
HGB UR QL STRIP: ABNORMAL
KETONES UR QL STRIP.AUTO: NEGATIVE MG/DL
LDLC SERPL CALC-MCNC: 82 MG/DL (ref 0–100)
LEUKOCYTE ESTERASE UR QL STRIP.AUTO: NEGATIVE
LIPID PROFILE,FLP: NORMAL
NITRITE UR QL STRIP.AUTO: NEGATIVE
PH UR STRIP: 6.5 [PH] (ref 5–8)
PROT UR STRIP-MCNC: 30 MG/DL
RBC #/AREA URNS HPF: ABNORMAL /HPF (ref 0–5)
SP GR UR REFRACTOMETRY: 1.02 (ref 1–1.03)
TRIGL SERPL-MCNC: 120 MG/DL (ref ?–150)
UROBILINOGEN UR QL STRIP.AUTO: 1 EU/DL (ref 0.2–1)
VLDLC SERPL CALC-MCNC: 24 MG/DL
WBC URNS QL MICRO: ABNORMAL /HPF (ref 0–4)

## 2019-11-19 NOTE — PROGRESS NOTES
Assessment/Plan:   She will return for fasting labs in red, ordered below, 2 weeks prior to follow up. ICD-10-CM ICD-9-CM    1. Type 2 diabetes mellitus without complication, without long-term current use of insulin (HCC) E11.9 250.00 AMB POC GLUCOSE BLOOD, BY GLUCOSE MONITORING DEVICE      metFORMIN (GLUCOPHAGE) 1,000 mg tablet      HEMOGLOBIN A1C WITH EAG   2. Type 2 diabetes with nephropathy (HCC) E11.21 250.40 glimepiride (AMARYL) 2 mg tablet     583.81 MICROALBUMIN, UR, RAND W/ MICROALB/CREAT RATIO   3. Essential hypertension I10 401.9 amLODIPine (NORVASC) 10 mg tablet      METABOLIC PANEL, COMPREHENSIVE   4. Other hyperlipidemia E78.49 272.4 atorvastatin (LIPITOR) 20 mg tablet   Fasting labs will include A1c, CMP, urine microalb  Follow-up and Dispositions    · Return in about 3 months (around 2/20/2020) for diabetes and labs 2 wks fasting before. There is room to increase the glimepiride. A1c is higher now than 3 months ago. Fasting 245 and protein in the urine. Glimepiride only at 1 mg  She does NOT want to be on insulin.  takes \"periodic\" insulin. [address], [address]  [address] [place], [place] [address]  Phone: [phone] Fax: [phone]      [facility]  Subjective:     Chief Complaint   Patient presents with    Diabetes     f/u    Shine Shoemaker is a 66 y.o. BLACK OR  female who speaks [place]. HPI:   Diabetes   Results for orders placed or performed in visit on 11/20/19   AMB POC GLUCOSE BLOOD, BY GLUCOSE MONITORING DEVICE   Result Value Ref Range    Glucose POC 245f mg/dL     Hemoglobin A1c   Date Value Ref Range Status   10/14/2019 9.0 (H) 4.2 - 6.3 % Final   07/08/2019 8.0 (H) 4.2 - 6.3 % Final     Wt Readings from Last 2 Encounters:   11/20/19 226 lb (102.5 kg)   09/09/19 222 lb (100.7 kg)   Weight up  Brought in medications?  yes   Last took medications: yesterday  Last ate: yesterday  Lifestyle  Working: did not discuss   Physical Activity? no  Eating healthy? no  Measuring glucoses? no   Medications:    Current Outpatient Medications   Medication Sig Dispense    metFORMIN (GLUCOPHAGE) 1,000 mg tablet Take 1 Tab by mouth two (2) times daily (with meals). 180 Tab    glimepiride (AMARYL) 2 mg tablet Take 1 Tab by mouth every morning. With food 90 Tab    amLODIPine (NORVASC) 10 mg tablet Take 1 Tab by mouth daily. 90 Tab    atorvastatin (LIPITOR) 20 mg tablet Take 1 Tab by mouth daily. For cholesterol 90 Tab    multivitamin (ONE A DAY) tablet Take 1 Tab by mouth daily. 90 Tab    omega-3 fatty acids-fish oil (FISH OIL) 360-1,200 mg cap Take 1 Cap by mouth daily. 90 Cap    triamcinolone acetonide (KENALOG) 0.1 % ointment Apply  to affected area two (2) times a day. use thin layer, do not apply to your face. 454 g    Blood-Glucose Meter monitoring kit For once daily blood glucose monitoring 1 Kit    Lancets misc For once daily blood glucose monitoring 1 Package    glucose blood VI test strips (FIFTY50 TEST STRIP) strip For once daily blood glucose monitoring 1 Package     No current facility-administered medications for this visit. We talked about switching to met ER but she has a lot of met 1000  Taking medications as prescribed? Except when she forgets to take the second metformin and if she doesn't remember glimepiride before breakfast she doesn't take it at all. Social History: She reports that she quit smoking about 36 years ago. She has never used smokeless tobacco. She reports current alcohol use. She reports that she does not use drugs. Family History: Her family history includes Breast Cancer in her mother; Cancer (age of onset: 68) in her mother; Diabetes in an other family member; Hypertension in an other family member.   ROS:     No increased frequency of urination,   no increased thirst;   no chest pain, dyspnea or TIA's;   no numbness, tingling or pain in extremities;   no reports of hypoglycemia. Objective:     Vitals:    11/20/19 1105   BP: 150/86   Pulse: 76   Temp: 98.1 °F (36.7 °C)   TempSrc: Oral   Weight: 226 lb (102.5 kg)    No LMP recorded. (Menstrual status: Menopause). Results for orders placed or performed in visit on 11/20/19   AMB POC GLUCOSE BLOOD, BY GLUCOSE MONITORING DEVICE   Result Value Ref Range    Glucose POC 245f mg/dL      Wt Readings from Last 2 Encounters:   11/20/19 226 lb (102.5 kg)   09/09/19 222 lb (100.7 kg)    Weight increased; Hemoglobin A1c   Date Value Ref Range Status   10/14/2019 9.0 (H) 4.2 - 6.3 % Final   07/08/2019 8.0 (H) 4.2 - 6.3 % Final    A1C increased;   Lab Results   Component Value Date/Time    Microalbumin/Creat ratio (mg/g creat) 46 (H) 07/08/2019 09:19 AM    Microalbumin,urine random 5.02 07/08/2019 09:19 AM    Creatinine 0.71 03/22/2019 12:23 PM      Lab Results   Component Value Date/Time    GFR est AA >60 03/22/2019 12:23 PM    GFR est non-AA >60 03/22/2019 12:23 PM       Lab Results   Component Value Date/Time    Cholesterol, total 153 10/14/2019 10:05 AM    HDL Cholesterol 47 10/14/2019 10:05 AM    LDL, calculated 82 10/14/2019 10:05 AM    Triglyceride 120 10/14/2019 10:05 AM    CHOL/HDL Ratio 3.3 10/14/2019 10:05 AM      Lab Results   Component Value Date/Time    ALT (SGPT) 22 03/22/2019 12:23 PM    AST (SGOT) 11 (L) 03/22/2019 12:23 PM    Alk. phosphatase 141 (H) 03/22/2019 12:23 PM    Bilirubin, total 0.4 03/22/2019 12:23 PM     Physical Exam:  Constitutional: She appears well-developed. Eyes: EOM are normal. Pupils are equal, round, and reactive to light. Neck: Neck supple. No thyromegaly present. Cardiovascular: Normal rate, regular rhythm, normal heart sounds and intact distal pulses. No murmur heard. Pulmonary/Chest: Effort normal and breath sounds normal.   Musculoskeletal: She exhibits no edema. No ulcers of the lower extremities.   Assessment/Plan:   Diagnoses and all orders for this visit: 1. Type 2 diabetes mellitus without complication, without long-term current use of insulin (HCC)  -     AMB POC GLUCOSE BLOOD, BY GLUCOSE MONITORING DEVICE  -     metFORMIN (GLUCOPHAGE) 1,000 mg tablet; Take 1 Tab by mouth two (2) times daily (with meals). -     HEMOGLOBIN A1C WITH EAG; Future    2. Type 2 diabetes with nephropathy (HCC)  -     glimepiride (AMARYL) 2 mg tablet; Take 1 Tab by mouth every morning. With food  -     MICROALBUMIN, UR, RAND W/ MICROALB/CREAT RATIO; Future    3. Essential hypertension  -     amLODIPine (NORVASC) 10 mg tablet; Take 1 Tab by mouth daily.  -     METABOLIC PANEL, COMPREHENSIVE; Future    4. Other hyperlipidemia  -     atorvastatin (LIPITOR) 20 mg tablet; Take 1 Tab by mouth daily. For cholesterol      Diabetes Mellitus type 2, under Poor control. Blood pressure under Fair control. Greater than 50% of this 25 minute visit was spent in face-to-face counseling/coordination of care regarding diabetes management. Follow-up and Dispositions    · Return in about 3 months (around 2/20/2020) for diabetes and labs 2 wks fasting before. Brielle Amador DNP, FNP-BC, BC-ADM  Pia Andrew expressed understanding of this plan.

## 2019-11-20 ENCOUNTER — OFFICE VISIT (OUTPATIENT)
Dept: FAMILY MEDICINE CLINIC | Age: 59
End: 2019-11-20

## 2019-11-20 VITALS
SYSTOLIC BLOOD PRESSURE: 150 MMHG | DIASTOLIC BLOOD PRESSURE: 86 MMHG | BODY MASS INDEX: 38.11 KG/M2 | HEART RATE: 76 BPM | TEMPERATURE: 98.1 F | WEIGHT: 226 LBS

## 2019-11-20 DIAGNOSIS — I10 ESSENTIAL HYPERTENSION: ICD-10-CM

## 2019-11-20 DIAGNOSIS — E11.21 TYPE 2 DIABETES WITH NEPHROPATHY (HCC): ICD-10-CM

## 2019-11-20 DIAGNOSIS — E11.9 TYPE 2 DIABETES MELLITUS WITHOUT COMPLICATION, WITHOUT LONG-TERM CURRENT USE OF INSULIN (HCC): Primary | ICD-10-CM

## 2019-11-20 DIAGNOSIS — E78.49 OTHER HYPERLIPIDEMIA: ICD-10-CM

## 2019-11-20 LAB — GLUCOSE POC: NORMAL MG/DL

## 2019-11-20 RX ORDER — AMLODIPINE BESYLATE 10 MG/1
10 TABLET ORAL DAILY
Qty: 90 TAB | Refills: 1 | Status: SHIPPED | OUTPATIENT
Start: 2019-11-20 | End: 2021-01-13 | Stop reason: SDUPTHER

## 2019-11-20 RX ORDER — METFORMIN HYDROCHLORIDE 1000 MG/1
1000 TABLET ORAL 2 TIMES DAILY WITH MEALS
Qty: 180 TAB | Refills: 1 | Status: SHIPPED | OUTPATIENT
Start: 2019-11-20 | End: 2021-01-13 | Stop reason: SDUPTHER

## 2019-11-20 RX ORDER — ATORVASTATIN CALCIUM 20 MG/1
20 TABLET, FILM COATED ORAL DAILY
Qty: 90 TAB | Refills: 1 | Status: SHIPPED | OUTPATIENT
Start: 2019-11-20 | End: 2021-01-13 | Stop reason: SDUPTHER

## 2019-11-20 RX ORDER — GLIMEPIRIDE 2 MG/1
2 TABLET ORAL
Qty: 90 TAB | Refills: 1 | Status: SHIPPED | OUTPATIENT
Start: 2019-11-20 | End: 2021-01-13

## 2019-11-20 NOTE — PATIENT INSTRUCTIONS
Teena Diaz  Hemoglobin A1c   Date Value Ref Range Status   10/14/2019 9.0 (H) 4.2 - 6.3 % Final   07/08/2019 8.0 (H) 4.2 - 6.3 % Final   03/22/2019 8.3 (H) 4.2 - 6.3 % Final   12/20/2016 7.1 (H) 4.8 - 5.6 % Final     Comment:              Pre-diabetes: 5.7 - 6.4           Diabetes: >6.4           Glycemic control for adults with diabetes: <7.0     03/31/2016 7.5 (H) 4.8 - 5.6 % Final     Comment:              Pre-diabetes: 5.7 - 6.4           Diabetes: >6.4           Glycemic control for adults with diabetes: <7.0     10/07/2015 6.9 (H) 4.8 - 5.6 % Final     Comment:              Increased risk for diabetes: 5.7 - 6.4           Diabetes: >6.4           Glycemic control for adults with diabetes: <7.0

## 2019-11-20 NOTE — PROGRESS NOTES
Coordination of Care  1. Have you been to the ER, urgent care clinic since your last visit? Hospitalized since your last visit? No    2. Have you seen or consulted any other health care providers outside of the [address] [place] [address] since your last visit? Include any pap smears or colon screening. No    Does the patient need refills?  YES    Learning Assessment Complete? yes   Results for orders placed or performed in visit on 11/20/19   AMB POC GLUCOSE BLOOD, BY GLUCOSE MONITORING DEVICE   Result Value Ref Range    Glucose POC 245f mg/dL

## 2019-11-20 NOTE — PROGRESS NOTES
AVS printed and reviewed. Change in Amaryl reviewed per provider notes. Appts scheduled per provider notes.

## 2021-01-13 ENCOUNTER — OFFICE VISIT (OUTPATIENT)
Dept: FAMILY MEDICINE CLINIC | Age: 61
End: 2021-01-13

## 2021-01-13 ENCOUNTER — HOSPITAL ENCOUNTER (OUTPATIENT)
Dept: LAB | Age: 61
Discharge: HOME | End: 2021-01-13

## 2021-01-13 VITALS
WEIGHT: 226 LBS | BODY MASS INDEX: 37.65 KG/M2 | DIASTOLIC BLOOD PRESSURE: 79 MMHG | SYSTOLIC BLOOD PRESSURE: 121 MMHG | HEART RATE: 78 BPM | TEMPERATURE: 98 F | HEIGHT: 65 IN | OXYGEN SATURATION: 98 %

## 2021-01-13 DIAGNOSIS — E11.9 TYPE 2 DIABETES MELLITUS WITHOUT COMPLICATION, WITHOUT LONG-TERM CURRENT USE OF INSULIN (HCC): Primary | ICD-10-CM

## 2021-01-13 DIAGNOSIS — I10 ESSENTIAL HYPERTENSION: ICD-10-CM

## 2021-01-13 DIAGNOSIS — Z23 ENCOUNTER FOR IMMUNIZATION: ICD-10-CM

## 2021-01-13 DIAGNOSIS — M94.0 COSTOCHONDRITIS: ICD-10-CM

## 2021-01-13 DIAGNOSIS — Z12.11 SCREEN FOR COLON CANCER: ICD-10-CM

## 2021-01-13 DIAGNOSIS — E78.49 OTHER HYPERLIPIDEMIA: ICD-10-CM

## 2021-01-13 DIAGNOSIS — E11.9 TYPE 2 DIABETES MELLITUS WITHOUT COMPLICATION, WITHOUT LONG-TERM CURRENT USE OF INSULIN (HCC): ICD-10-CM

## 2021-01-13 LAB — GLUCOSE POC: NORMAL MG/DL

## 2021-01-13 PROCEDURE — 80061 LIPID PANEL: CPT

## 2021-01-13 PROCEDURE — 90732 PPSV23 VACC 2 YRS+ SUBQ/IM: CPT | Performed by: FAMILY MEDICINE

## 2021-01-13 PROCEDURE — 99214 OFFICE O/P EST MOD 30 MIN: CPT | Performed by: FAMILY MEDICINE

## 2021-01-13 PROCEDURE — 82607 VITAMIN B-12: CPT

## 2021-01-13 PROCEDURE — 83036 HEMOGLOBIN GLYCOSYLATED A1C: CPT

## 2021-01-13 PROCEDURE — 80053 COMPREHEN METABOLIC PANEL: CPT

## 2021-01-13 PROCEDURE — 82746 ASSAY OF FOLIC ACID SERUM: CPT

## 2021-01-13 PROCEDURE — 82962 GLUCOSE BLOOD TEST: CPT | Performed by: FAMILY MEDICINE

## 2021-01-13 PROCEDURE — 90471 IMMUNIZATION ADMIN: CPT | Performed by: FAMILY MEDICINE

## 2021-01-13 PROCEDURE — 82043 UR ALBUMIN QUANTITATIVE: CPT

## 2021-01-13 PROCEDURE — 85025 COMPLETE CBC W/AUTO DIFF WBC: CPT

## 2021-01-13 PROCEDURE — 82306 VITAMIN D 25 HYDROXY: CPT

## 2021-01-13 RX ORDER — AMLODIPINE BESYLATE 10 MG/1
10 TABLET ORAL DAILY
Qty: 90 TAB | Refills: 3 | Status: SHIPPED | OUTPATIENT
Start: 2021-01-13 | End: 2022-06-16 | Stop reason: ALTCHOICE

## 2021-01-13 RX ORDER — ATORVASTATIN CALCIUM 20 MG/1
20 TABLET, FILM COATED ORAL DAILY
Qty: 90 TAB | Refills: 3 | Status: SHIPPED | OUTPATIENT
Start: 2021-01-13 | End: 2022-07-05 | Stop reason: SDUPTHER

## 2021-01-13 RX ORDER — DICLOFENAC SODIUM 10 MG/G
2 GEL TOPICAL 3 TIMES DAILY
Qty: 100 G | Refills: 2 | Status: SHIPPED | OUTPATIENT
Start: 2021-01-13

## 2021-01-13 RX ORDER — METFORMIN HYDROCHLORIDE 1000 MG/1
1000 TABLET ORAL 2 TIMES DAILY WITH MEALS
Qty: 180 TAB | Refills: 3 | Status: SHIPPED | OUTPATIENT
Start: 2021-01-13 | End: 2022-06-16 | Stop reason: SDUPTHER

## 2021-01-13 NOTE — PROGRESS NOTES
HISTORY OF PRESENT ILLNESS  Tre Patterson is a 64 y.o. female. HPI  Patient states on Friday she was having chest pain, and felt a lump on the chest.  If she would breath or cough it would become very painful. She is feeling better now. This is something that reoccur. She states she was working on the yard. Racking leaf and bagging. It has kind of linger. Patient states at some point she was thinking she was having a heart attack. Patient states she had stopped taking her medication, then 3 months ago started taking them again. The prescriptions had ran out. Patient is taking metformin one a day   Review of Systems   Constitutional: Negative for chills, fever and weight loss. HENT: Negative for ear discharge, ear pain, hearing loss and tinnitus. Eyes: Negative for blurred vision, double vision, photophobia and pain. Respiratory: Negative for cough, hemoptysis, sputum production and shortness of breath. Cardiovascular: Positive for chest pain. Negative for palpitations, orthopnea and claudication. Gastrointestinal: Negative for abdominal pain, constipation, diarrhea, heartburn, nausea and vomiting. Genitourinary: Negative for dysuria, frequency and urgency. Musculoskeletal: Positive for myalgias. Negative for back pain, joint pain and neck pain. Neurological: Negative for dizziness, tingling and headaches. /79 (BP 1 Location: Left arm, BP Patient Position: Sitting)   Pulse 78   Temp 98 °F (36.7 °C) (Temporal)   Ht 5' 5.32\" (1.659 m)   Wt 226 lb (102.5 kg)   SpO2 98%   BMI 37.25 kg/m²   Physical Exam  Constitutional:       General: She is not in acute distress. HENT:      Head: Normocephalic. Right Ear: Tympanic membrane normal.      Left Ear: Tympanic membrane normal.   Neck:      Musculoskeletal: Normal range of motion. No neck rigidity or muscular tenderness. Cardiovascular:      Rate and Rhythm: Normal rate and regular rhythm. Pulses: Normal pulses. Heart sounds: No murmur. Pulmonary:      Effort: Pulmonary effort is normal. No respiratory distress. Breath sounds: Normal breath sounds. No wheezing or rhonchi. Chest:      Chest wall: No tenderness. Musculoskeletal: Normal range of motion. General: No swelling, tenderness, deformity or signs of injury. Neurological:      Mental Status: She is alert. ASSESSMENT and PLAN  Diagnoses and all orders for this visit:    1. Type 2 diabetes mellitus without complication, without long-term current use of insulin ([facility])  -     AMB POC GLUCOSE BLOOD, BY GLUCOSE MONITORING DEVICE  -     metFORMIN (GLUCOPHAGE) 1,000 mg tablet; Take 1 Tab by mouth two (2) times daily (with meals). -     LIPID PANEL; Future  -     METABOLIC PANEL, COMPREHENSIVE; Future  -     CBC WITH AUTOMATED DIFF; Future  -     MICROALBUMIN, UR, RAND W/ MICROALB/CREAT RATIO; Future  -     HEMOGLOBIN A1C WITH EAG; Future  -     VITAMIN B12; Future  -     VITAMIN D, 25 HYDROXY; Future  -     FOLATE; Future  -     REFERRAL TO OPHTHALMOLOGY  -     REFERRAL TO NUTRITION    2. Other hyperlipidemia  -     atorvastatin (LIPITOR) 20 mg tablet; Take 1 Tab by mouth daily. For cholesterol    3. Essential hypertension  -     amLODIPine (NORVASC) 10 mg tablet; Take 1 Tab by mouth daily. 4. Screen for colon cancer  -     OCCULT BLOOD IMMUNOASSAY,DIAGNOSTIC; Future    5. Costochondritis  -     diclofenac (VOLTAREN) 1 % gel; Apply 2 g to affected area three (3) times daily. Left ribcage area x 10 days      64year old patient with diabetes, has not been seen in over a year.   We will update her labs  Increase metformin to twice a day  We will refer to nutrition  Pneumovax 23 given today  She has had some costochondritis, we will write for topical NSAID  Mammogram and colon cancer screening ordered  Follow up in 3 months

## 2021-01-13 NOTE — PROGRESS NOTES
Check-out Note: Pneumovax 23   [facility] for mammogram   Fecal occult blood test   Ophthalmology referral ([facility])    Immunization given per provider order following policy and protocol. Please see scanned consent form. VIS given. No contraindications to vaccines. Documented in [address] [place]. Instructions for adverse reactions discussed. Explained that if symptoms (rash, hives SOB, temperature higher of 102'f) to go to the nearest ER. Patient instructed to wait in the clinic for 15 minutes  to observe for any signs of immediate reaction. No reaction noted at time of discharge. [facility] program explained and flyer given. Fit test instructions reviewed and supplies given. Opthalmology referral process for [facility] explained and patient aware that she will have to meet with javon DEL CASTILLO to complete financial screening. This has been fully explained to the patient, who indicates understanding and agrees with plan. No further questions at this time.  Akbar Causey RN

## 2021-01-13 NOTE — PROGRESS NOTES
Coordination of Care  1. Have you been to the ER, urgent care clinic since your last visit? Hospitalized since your last visit? No    2. Have you seen or consulted any other health care providers outside of the [address] [place] [address] since your last visit? Include any pap smears or colon screening. No    Does the patient need refills? YES    Learning Assessment Complete?  yes  Depression Screening complete in the past 12 months? yes  Results for orders placed or performed in visit on 01/13/21   AMB POC GLUCOSE BLOOD, BY GLUCOSE MONITORING DEVICE   Result Value Ref Range    Glucose  fasting mg/dL

## 2021-01-14 ENCOUNTER — TELEPHONE (OUTPATIENT)
Dept: FAMILY MEDICINE CLINIC | Age: 61
End: 2021-01-14

## 2021-01-14 LAB
25(OH)D3 SERPL-MCNC: 24.5 NG/ML (ref 30–100)
ALBUMIN SERPL-MCNC: 4.2 G/DL (ref 3.5–5)
ALBUMIN/GLOB SERPL: 1.4 {RATIO} (ref 1.1–2.2)
ALP SERPL-CCNC: 128 U/L (ref 45–117)
ALT SERPL-CCNC: 24 U/L (ref 12–78)
ANION GAP SERPL CALC-SCNC: 8 MMOL/L (ref 5–15)
AST SERPL-CCNC: 16 U/L (ref 15–37)
BASOPHILS # BLD: 0.1 K/UL (ref 0–0.1)
BASOPHILS NFR BLD: 1 % (ref 0–1)
BILIRUB SERPL-MCNC: 0.4 MG/DL (ref 0.2–1)
BUN SERPL-MCNC: 15 MG/DL (ref 6–20)
BUN/CREAT SERPL: 20 (ref 12–20)
CALCIUM SERPL-MCNC: 9.3 MG/DL (ref 8.5–10.1)
CHLORIDE SERPL-SCNC: 101 MMOL/L (ref 97–108)
CHOLEST SERPL-MCNC: 168 MG/DL
CO2 SERPL-SCNC: 27 MMOL/L (ref 21–32)
CREAT SERPL-MCNC: 0.76 MG/DL (ref 0.55–1.02)
CREAT UR-MCNC: 73.3 MG/DL
DIFFERENTIAL METHOD BLD: ABNORMAL
EOSINOPHIL # BLD: 0.1 K/UL (ref 0–0.4)
EOSINOPHIL NFR BLD: 2 % (ref 0–7)
ERYTHROCYTE [DISTWIDTH] IN BLOOD BY AUTOMATED COUNT: 12.9 % (ref 11.5–14.5)
EST. AVERAGE GLUCOSE BLD GHB EST-MCNC: 177 MG/DL
FOLATE SERPL-MCNC: 31.3 NG/ML (ref 5–21)
GLOBULIN SER CALC-MCNC: 3 G/DL (ref 2–4)
GLUCOSE SERPL-MCNC: 201 MG/DL (ref 65–100)
HBA1C MFR BLD: 7.8 % (ref 4–5.6)
HCT VFR BLD AUTO: 40 % (ref 35–47)
HDLC SERPL-MCNC: 59 MG/DL
HDLC SERPL: 2.8 {RATIO} (ref 0–5)
HGB BLD-MCNC: 12.8 G/DL (ref 11.5–16)
IMM GRANULOCYTES # BLD AUTO: 0.1 K/UL (ref 0–0.04)
IMM GRANULOCYTES NFR BLD AUTO: 1 % (ref 0–0.5)
LDLC SERPL CALC-MCNC: 86.8 MG/DL (ref 0–100)
LIPID PROFILE,FLP: NORMAL
LYMPHOCYTES # BLD: 2.2 K/UL (ref 0.8–3.5)
LYMPHOCYTES NFR BLD: 32 % (ref 12–49)
MCH RBC QN AUTO: 26.6 PG (ref 26–34)
MCHC RBC AUTO-ENTMCNC: 32 G/DL (ref 30–36.5)
MCV RBC AUTO: 83.2 FL (ref 80–99)
MICROALBUMIN UR-MCNC: 3.72 MG/DL
MICROALBUMIN/CREAT UR-RTO: 51 MG/G (ref 0–30)
MONOCYTES # BLD: 0.4 K/UL (ref 0–1)
MONOCYTES NFR BLD: 6 % (ref 5–13)
NEUTS SEG # BLD: 3.9 K/UL (ref 1.8–8)
NEUTS SEG NFR BLD: 58 % (ref 32–75)
NRBC # BLD: 0 K/UL (ref 0–0.01)
NRBC BLD-RTO: 0 PER 100 WBC
PLATELET # BLD AUTO: 345 K/UL (ref 150–400)
PMV BLD AUTO: 10 FL (ref 8.9–12.9)
POTASSIUM SERPL-SCNC: 4.2 MMOL/L (ref 3.5–5.1)
PROT SERPL-MCNC: 7.2 G/DL (ref 6.4–8.2)
RBC # BLD AUTO: 4.81 M/UL (ref 3.8–5.2)
SODIUM SERPL-SCNC: 136 MMOL/L (ref 136–145)
TRIGL SERPL-MCNC: 111 MG/DL (ref ?–150)
VIT B12 SERPL-MCNC: 583 PG/ML (ref 193–986)
VLDLC SERPL CALC-MCNC: 22.2 MG/DL
WBC # BLD AUTO: 6.8 K/UL (ref 3.6–11)

## 2021-01-14 RX ORDER — ERGOCALCIFEROL 1.25 MG/1
50000 CAPSULE ORAL
Qty: 12 CAP | Refills: 1 | Status: SHIPPED | OUTPATIENT
Start: 2021-01-14 | End: 2022-07-05 | Stop reason: SDUPTHER

## 2021-01-14 NOTE — PROGRESS NOTES
Diabetes control is better than last time it was checked, her hemoglobin a1c is 7.8%  Normal liver function, kidney function, electrolytes, cholesterol. The vitamin D is low.   We will send a prescription for once a week vitamin D to the pharmacy  Patient can be informed
Tc to the pt. The pt was given her entire lab results message from the provider. The pt was given the providers recommendations of taking the Vit D 1 pill weekly. The pt was told the rx had been sent to the Pharmacy. The pt's Pharmacy was confirmed. The pt was asked if she needed a good rx coupon to get a discount on ht emedicine and she stated no she has a good rx card. The pt verbalized understanding of the lab result information given to her. The pt's medication's that were ordered yesterday were also reviewed with her and the times to take the medication's were reviewed with the pt.  Misti Forte RN
Skin normal color for race, warm, dry and intact. No evidence of rash.

## 2021-01-18 ENCOUNTER — TELEPHONE (OUTPATIENT)
Dept: CARDIAC REHAB | Age: 61
End: 2021-01-18

## 2021-01-18 NOTE — TELEPHONE ENCOUNTER
1/18/2021 Cardiac Wellness: Called Ms. Cliff Babcock to verify insurance information and she will cancel the nutrition appointment for Monday, 1/25/2021. She currently does not have insurance and she has plenty of resources at home and knows what she should be doing not what not to be doing, she told me. I told her to reach out to us if she did need us, we are here for her.  Ariel Inman

## 2021-01-19 ENCOUNTER — HOSPITAL ENCOUNTER (OUTPATIENT)
Dept: LAB | Age: 61
Discharge: HOME | End: 2021-01-19

## 2021-01-19 ENCOUNTER — LAB ONLY (OUTPATIENT)
Dept: FAMILY MEDICINE CLINIC | Age: 61
End: 2021-01-19

## 2021-01-19 DIAGNOSIS — Z12.11 SCREEN FOR COLON CANCER: ICD-10-CM

## 2021-01-19 PROCEDURE — 82274 ASSAY TEST FOR BLOOD FECAL: CPT

## 2021-01-19 NOTE — PROGRESS NOTES
Patient was here to drop off Occult blood immunoassay stool sample as per Dr. Froylan Canales MD. Per patient, specimen was collected at home 1/19/2021 at 11:30 am

## 2021-01-21 LAB — HEMOCCULT STL QL IA: NEGATIVE

## 2021-01-25 ENCOUNTER — APPOINTMENT (OUTPATIENT)
Dept: CARDIAC REHAB | Age: 61
End: 2021-01-25
Attending: FAMILY MEDICINE

## 2021-01-27 NOTE — PROGRESS NOTES
Tc to the pt she was spoken to and given the results of her stool specimen test. The pt was told it was negative and repeat it in 1 yr.  Nishi Bunch RN

## 2021-03-01 ENCOUNTER — IMMUNIZATION (OUTPATIENT)
Dept: FAMILY MEDICINE CLINIC | Age: 61
End: 2021-03-01

## 2021-03-01 DIAGNOSIS — Z23 ENCOUNTER FOR IMMUNIZATION: Primary | ICD-10-CM

## 2021-03-01 PROCEDURE — 0001A COVID-19, MRNA, LNP-S, PF, 30MCG/0.3ML DOSE(PFIZER): CPT

## 2021-03-01 PROCEDURE — 91300 COVID-19, MRNA, LNP-S, PF, 30MCG/0.3ML DOSE(PFIZER): CPT

## 2021-03-22 ENCOUNTER — IMMUNIZATION (OUTPATIENT)
Dept: FAMILY MEDICINE CLINIC | Age: 61
End: 2021-03-22

## 2021-03-22 DIAGNOSIS — Z23 ENCOUNTER FOR IMMUNIZATION: Primary | ICD-10-CM

## 2021-03-22 PROCEDURE — 91300 COVID-19, MRNA, LNP-S, PF, 30MCG/0.3ML DOSE(PFIZER): CPT

## 2021-03-23 ENCOUNTER — VIRTUAL VISIT (OUTPATIENT)
Dept: FAMILY MEDICINE CLINIC | Age: 61
End: 2021-03-23

## 2021-03-23 DIAGNOSIS — Z71.3 DIETARY COUNSELING AND SURVEILLANCE: Primary | ICD-10-CM

## 2021-03-23 PROCEDURE — 97802 MEDICAL NUTRITION INDIV IN: CPT

## 2021-03-23 NOTE — PROGRESS NOTES
Caleb Modi consented to virtual/telephonic nutrition consultation. Video is declined. Pt identity verified by  and full name    DATE: 3/23/2021      REFERRING PHYSICIAN: Dr. Shakeel Ayoub  NAME: Caleb Modi : 1960 AGE: 64 y.o. GENDER: female  REASON FOR VISIT: wt loss and T2DM    ASSESSMENT:  Past Medical Hx: obesity, T2DM      LABS:   Lab Results   Component Value Date/Time    Hemoglobin A1c 7.8 (H) 2021 08:47 PM       MEDICATIONS/SUPPLEMENTS:     Prior to Admission medications    Medication Sig Start Date End Date Taking? Authorizing Provider   ergocalciferol (ERGOCALCIFEROL) 1,250 mcg (50,000 unit) capsule Take 1 Cap by mouth every seven (7) days. 21   Jonas Carlin MD   metFORMIN (GLUCOPHAGE) 1,000 mg tablet Take 1 Tab by mouth two (2) times daily (with meals). 21   Jonas Carlin MD   atorvastatin (LIPITOR) 20 mg tablet Take 1 Tab by mouth daily. For cholesterol 21   Prashant Sun MD   amLODIPine (NORVASC) 10 mg tablet Take 1 Tab by mouth daily. 21   Jonas Carlin MD   diclofenac (VOLTAREN) 1 % gel Apply 2 g to affected area three (3) times daily. Left ribcage area x 10 days 21   Jonas Carlin MD   multivitamin (ONE A DAY) tablet Take 1 Tab by mouth daily.  16   Christopher Marshall NP   Blood-Glucose Meter monitoring kit For once daily blood glucose monitoring 14   Chuy Sarkar MD   Lancets misc For once daily blood glucose monitoring 14   Chuy Sarkar MD   glucose blood VI test strips (FIFTY50 TEST STRIP) strip For once daily blood glucose monitoring 14   Chuy Sarkar MD       FOOD ALLERGIES/INTOLERANCES: NA    ANTHROPOMETRICS:    Ht Readings from Last 1 Encounters:   21 5' 5.32\" (1.659 m)      Wt Readings from Last 1 Encounters:   21 226 lb (102.5 kg)      IBW:125 # +/- 10%  %IBW: 180 % +/- 10%    BMI: 37 Category: obesity class II    Reported Wt Hx: Lost about 45 pounds last summer by changing diet. Has gained it all back plus some    Estimated Nutritional Needs: 2547-0997 kcals/day using ABW 71 kg    Reported Diet Hx: has been able to lose weight in the past       Exercise/Physical Actvity:    None    Environmental/Social:    Working from home due to pandemic  Has two teenage foster children on the spectrum  Pt does food prep, cooking and shopping  Feels stressed. \"This is the heaviest I have ever weighed. \"           NUTRITION INTERVENTION:    Introduced MyPlate and discussed how it can help to ensure both balance and variety. Reviewed the food groups and what each group contributes to our bodies. RD spent most of the session providing encouragement and discussing small changes that pt can make to regain confidence in her ability to lose weight. PATIENT GOALS:  1) pt will start journaling her food intake  2) Begin meal planning again     Specific tips and techniques to facilitate compliance with above recommendations were provided and discussed. Pt was strongly encouraged to begin making necessary changes now, and re-visit the dietitian prn.             Damon Andino RD

## 2021-04-13 ENCOUNTER — VIRTUAL VISIT (OUTPATIENT)
Dept: FAMILY MEDICINE CLINIC | Age: 61
End: 2021-04-13

## 2021-04-13 DIAGNOSIS — Z71.3 DIETARY COUNSELING AND SURVEILLANCE: Primary | ICD-10-CM

## 2021-04-13 PROCEDURE — 97803 MED NUTRITION INDIV SUBSEQ: CPT

## 2021-04-13 NOTE — PROGRESS NOTES
Diana Granda consented to virtual/telephonic nutrition consultation. Video is declined. Pt identity verified by  and full name    F/U for T2DM and wt loss    No current weight (virtual appt)    Lifestyle changes include:   Walking 45 minutes/day   Aiming to drink approximately 50 oz water daily   Journaling daily about gratitude with plans to include food    RD provided feedback and encouragement to continue with positive changes already made. Discussed how 10% wt loss will help improve health outcomes. Discussed how small changes lead to big changes.      Goal: pt will aim for 10,000 steps per day and use her fitbit to track  Try and meal plan 3 days/week    F/U in 1 month

## 2021-06-24 ENCOUNTER — OFFICE VISIT (OUTPATIENT)
Dept: FAMILY MEDICINE CLINIC | Age: 61
End: 2021-06-24

## 2021-06-24 VITALS
SYSTOLIC BLOOD PRESSURE: 128 MMHG | HEIGHT: 65 IN | HEART RATE: 81 BPM | BODY MASS INDEX: 37.99 KG/M2 | OXYGEN SATURATION: 98 % | TEMPERATURE: 97.7 F | WEIGHT: 228 LBS | DIASTOLIC BLOOD PRESSURE: 70 MMHG

## 2021-06-24 DIAGNOSIS — R80.9 TYPE 2 DIABETES MELLITUS WITH MICROALBUMINURIA, WITHOUT LONG-TERM CURRENT USE OF INSULIN (HCC): Primary | ICD-10-CM

## 2021-06-24 DIAGNOSIS — E11.29 TYPE 2 DIABETES MELLITUS WITH MICROALBUMINURIA, WITHOUT LONG-TERM CURRENT USE OF INSULIN (HCC): Primary | ICD-10-CM

## 2021-06-24 DIAGNOSIS — R07.89 LEFT CHEST PRESSURE: ICD-10-CM

## 2021-06-24 DIAGNOSIS — I10 ESSENTIAL HYPERTENSION: ICD-10-CM

## 2021-06-24 LAB — GLUCOSE POC: NORMAL MG/DL

## 2021-06-24 PROCEDURE — 3051F HG A1C>EQUAL 7.0%<8.0%: CPT | Performed by: FAMILY MEDICINE

## 2021-06-24 PROCEDURE — 82962 GLUCOSE BLOOD TEST: CPT | Performed by: FAMILY MEDICINE

## 2021-06-24 PROCEDURE — 99214 OFFICE O/P EST MOD 30 MIN: CPT | Performed by: FAMILY MEDICINE

## 2021-06-24 PROCEDURE — 93000 ELECTROCARDIOGRAM COMPLETE: CPT | Performed by: FAMILY MEDICINE

## 2021-06-24 RX ORDER — GLIMEPIRIDE 1 MG/1
1 TABLET ORAL
Qty: 30 TABLET | Refills: 0
Start: 2021-06-24 | End: 2021-10-08

## 2021-06-24 NOTE — PROGRESS NOTES
Coordination of Care  1. Have you been to the ER, urgent care clinic since your last visit? Hospitalized since your last visit? No    2. Have you seen or consulted any other health care providers outside of the [address] [place] [address] since your last visit? Include any pap smears or colon screening. No    Does the patient need refills?  YES    Learning Assessment Complete? yes   Results for orders placed or performed in visit on 06/24/21   AMB POC GLUCOSE BLOOD, BY GLUCOSE MONITORING DEVICE   Result Value Ref Range    Glucose  f MG/DL

## 2021-06-24 NOTE — Clinical Note
Amber, I gave patient PAP application today. She hopes that she qualifies. Please call her to discuss this further. She is hoping to get her Januvia through PAP.  Mignon Garcia RN

## 2021-06-24 NOTE — PROGRESS NOTES
Zac Craven (: 1960) is a 64 y.o. female, established patient, here for evaluation of the following chief complaint(s):  Diabetes (f/u)       ASSESSMENT/PLAN:  1. Type 2 diabetes mellitus with microalbuminuria, without long-term current use of insulin (HCC)  Uncontrolled, likely due to her recent weight gain. She will re institute her diet. Will have her restart her Amaryl QAM.  Will also order Januvia which can be more weight neutral than Amaryl to help control her BS. Discussed restarting Lisinopril for her microalbuminuria. Will restart this at next visit but may need to decrease Norvasc.  -     AMB POC GLUCOSE BLOOD, BY GLUCOSE MONITORING DEVICE  2. Left chest pressure  Resolved. NL EKG. Discussed warning signs that need ED evaluation.  -     AMB POC EKG ROUTINE W/  LEADS, INTER & REP  3. Essential hypertension  Controlled. Follow-up and Dispositions    · Return in about 6 weeks (around 2021) for follow up for F2F in 6 weeks for DM check. SUBJECTIVE:  HPI  DM:  Patient is taking Glucophage regularly without any side effects. Started taking some left over Glimepiride 1 mg 3 days ago. Checking BS QAM:  Normally 100s, but for the past month BS is 200-300 in AM.  Patient was able to lose weight a few years ago with carb restriction but has regained much of the weight over the past year. Lt arm/chest pain: Lt arm heaviness this AM radiating into Lt chest.  NO pain, dyspnea, cough, diaphoresis. Denies GERD sx. HTN:  Patient is taking Norvasc regularly without any side effects. Review of Systems   Constitutional: Positive for unexpected weight change. Negative for diaphoresis, fatigue and fever. Respiratory: Negative for cough, chest tightness and shortness of breath. Cardiovascular: Positive for leg swelling. Negative for chest pain and palpitations. Neurological: Negative for syncope and light-headedness.      OBJECTIVE:  Blood pressure 128/70, pulse 81, temperature 97.7 °F (36.5 °C), temperature source Temporal, height 5' 4.57\" (1.64 m), weight 228 lb (103.4 kg), SpO2 98 %. Physical Exam  CONSTITUTIONAL:  Well developed. No apparent distress. PSYCHIATRIC: Oriented to time, place, person & situation. Appropriate mood and affect. NECK:  Normal inspection, normal palpation without any lymphadenopathy, masses, or thyromegaly  CARDIOVASCULAR:  Regular rate and rhythm. Normal S1, S2. No extra sounds. RESPIRATORY:  Normal effort. Normal ascultation without wheezing. VASCULAR:  Normal Carotid pulses without bruits. Normal Posterior Tibialis pulses. EXTREMITIES:  No edema. Results for orders placed or performed in visit on 06/24/21   AMB POC GLUCOSE BLOOD, BY GLUCOSE MONITORING DEVICE   Result Value Ref Range    Glucose  f MG/DL         An electronic signature was used to authenticate this note.   -- Urabn William MD

## 2021-06-24 NOTE — PROGRESS NOTES
I have printed AVS and reviewed it with patient today. I gave pt a PAP application today. I reviewed with pt how to complete and options for income verification. Pt understands that it can be mailed to PAP coordinator who is Nick Forbes, or it can be faxed to PAP coordinator at our office. Patient prefers to go this route for her Januvia. Patient taken to registrar to make follow up appointment(s) as requested by provider.  Mignon Dunn RN

## 2021-08-24 ENCOUNTER — TELEPHONE (OUTPATIENT)
Dept: FAMILY MEDICINE CLINIC | Age: 61
End: 2021-08-24

## 2021-08-24 NOTE — TELEPHONE ENCOUNTER
Return call made to the patient at [phone], who had left a message for me several days ago asking for a return call about her application for Januvia through PAP. At this time, I have not received a completed PAP application from the patient. There was no answer, so a message was left explaining that I was returning her call.  Charly Marks RN

## 2021-10-06 DIAGNOSIS — E11.9 DM TYPE 2, GOAL A1C TO BE DETERMINED (HCC): Primary | ICD-10-CM

## 2021-10-06 NOTE — PROGRESS NOTES
[facility] medication to be ordered for patient:  Januvia 100 mg si po daily  Routing to PAP coordinator to process the rx request, thank you

## 2021-10-08 ENCOUNTER — DOCUMENTATION ONLY (OUTPATIENT)
Dept: FAMILY PLANNING/WOMEN'S HEALTH CLINIC | Age: 61
End: 2021-10-08

## 2021-10-08 RX ORDER — GLIMEPIRIDE 1 MG/1
1 TABLET ORAL
Qty: 30 TABLET | Refills: 0
Start: 2021-10-08 | End: 2022-06-16 | Stop reason: ALTCHOICE

## 2021-10-08 NOTE — PROGRESS NOTES
Medication planning:  When pt receives Januvia from [facility], she will stop Amaryl.   Thank you

## 2021-10-12 ENCOUNTER — DOCUMENTATION ONLY (OUTPATIENT)
Dept: FAMILY MEDICINE CLINIC | Age: 61
End: 2021-10-12

## 2021-10-12 NOTE — PROGRESS NOTES
Patient's application for Januvia 100mg, 1 tab PO daily, was sent to PAP on 10-11-21.  Deborah Crocker RN

## 2021-10-25 ENCOUNTER — DOCUMENTATION ONLY (OUTPATIENT)
Dept: FAMILY MEDICINE CLINIC | Age: 61
End: 2021-10-25

## 2021-10-25 NOTE — PROGRESS NOTES
[facility] medication authorization approved to arrange for delivery of the following:  Januiva 100 mg sig 1 po every day  Pt to STOP amaryl when she receives the Januvia  F/up with PCP as directed    Routing to PAP coordinator to arrange for delivery, thank you

## 2021-10-25 NOTE — PROGRESS NOTES
Receipt of patient's PAP order of one bottle of Januvia 100mg, 90 tabs/bottle, has been entered into the log book. Routing to Karen Chakraborty for dose confirmation and permission to deliver to the patient. Amber Soto RN    SITagliptin (JANUVIA) 100 mg tablet [[id number]]     Order Details  Dose: 100 mg Route: Oral Frequency: DAILY   Dispense Quantity: 90 Tablet Refills: 3          Sig: Take 1 Tablet by mouth daily.  [facility] medication, no print         Start Date: 10/06/21 End Date: --   Written Date: 10/06/21 Expiration Date: --       Diagnosis Association: DM type 2, goal A1C to be determined [facility]) (E11.9)   Providers    Authorizing Provider: CHRISTIANE Manzano NPI: [id number]   MARIANNA #: [id number] [de-identified] : TELEHEALTH video visit

## 2021-10-28 NOTE — PROGRESS NOTES
T/C made to the patient earlier today to schedule delivery of her PAP order of Januvia. There was no answer so a generic message was left asking for a return call to nurse Clark at the [facility], [phone], about her medication.  Johnathon Leon RN

## 2021-11-03 ENCOUNTER — DOCUMENTATION ONLY (OUTPATIENT)
Dept: FAMILY MEDICINE CLINIC | Age: 61
End: 2021-11-03

## 2021-11-03 NOTE — PROGRESS NOTES
11-01-21: T/C made to the patient to schedule delivery of her PAP order of Januvia. The patient stated that she was out of town and that her , Freida Franco, could receive the delivery at their home later in the afternoon. She confirmed her address as the one listed in CC and we reviewed the provider's dose instructions including the need for her to stop taking Glimepiride once she begins taking Januvia. The patient stated the she has been out of Glimepiride for several days. She also stated that she expected to be back at home the next day and would start taking the [address] then. She expressed understanding of the provider's instructions and I reminded her to please call me at [phone] if she has any questions about her PAP medication and also in two months to request a refill from PAP. The patient's PAP order of Januvia was delivered to her home as planned and received by her  Freida Franco. Due to Covid-19 precautions, the PAP med pick-up slip was not signed.  Jose Latham RN

## 2021-12-28 ENCOUNTER — DOCUMENTATION ONLY (OUTPATIENT)
Dept: FAMILY MEDICINE CLINIC | Age: 61
End: 2021-12-28

## 2021-12-28 NOTE — PROGRESS NOTES
12-27-21: Receipt of patient's PAP order of one bottle of Januvia 100mg, 90 tabs/bottle, has been entered into the log book. Routing to Dr. Ange Taveras for dose confirmation and permission to deliver to the patient Amber Soto RN    SITagliptin (JANUVIA) 100 mg tablet [[id number]]     Order Details  Dose: 100 mg Route: Oral Frequency: DAILY   Dispense Quantity: 90 Tablet Refills: 3          Sig: Take 1 Tablet by mouth daily.  [facility] medication, no print         Start Date: 10/06/21 End Date: --   Written Date: 10/06/21 Expiration Date: --       Diagnosis Association: DM type 2, goal A1C to be determined [facility]) (E11.9)     Providers    Authorizing Provider:    Lakshmi Dietz, [address] [place] [address]   Phone:  [phone]   Fax:  [phone]   MARIANNA #:  [id number]   NPI:  [id number]

## 2022-01-01 NOTE — PROGRESS NOTES
Medication Management (PAP-Januvia)     Vanesa Swan MD  You 3 days ago     NC    Medication dose reviewed, medication may be dispensed    Message text       Jenny Ozuna MD 4 days ago     12-29-21: T/C made to the patient to schedule delivery of her PAP order of Januvia. There was no answer, so a message was left asking for a return call to nurse Clark at the [address] [place] [address], [phone], about a medication order. A second call was made to the patient later in the day and another message was left.  Jenni Martino RN

## 2022-01-07 NOTE — PROGRESS NOTES
01-04-22: T/C made to the patient to schedule delivery of her PAP order of Januvia. The patient agreed to a delivery at her home in the morning on 01-05-22 and confirmed her address as the one listed in [address] [place] [address]. We reviewed the provider's dose instructions and a reminder to please call me at [phone] in two months to request a refill of the Januvia through PAP. The patient expressed understanding. 01-05-22: Patient's PAP order of Januvia was delivered to her at her home as planned by Karely Waters. Due to Covid-19 precautions, the PAP med pick-up slip was not signed.  Castro Frey RN

## 2022-03-08 ENCOUNTER — DOCUMENTATION ONLY (OUTPATIENT)
Dept: FAMILY MEDICINE CLINIC | Age: 62
End: 2022-03-08

## 2022-03-08 NOTE — PROGRESS NOTES
Receipt of patient's PAP order of one bottle of Januvia 100mg, 90 tabs/bottle, has been entered into the log book. Routing to Dr. Edwardo Trivedi for dose confirmation and permission to deliver to the patient. Amber Soto RN    SITagliptin (JANUVIA) 100 mg tablet [[id number]]     Order Details  Dose: 100 mg Route: Oral Frequency: DAILY   Dispense Quantity: 90 Tablet Refills: 3          Sig: Take 1 Tablet by mouth daily.  [facility] medication, no print         Start Date: 10/06/21 End Date: --   Written Date: 10/06/21 Expiration Date: --       Diagnosis Association: DM type 2, goal A1C to be determined [facility]) (E11.9)     Providers    Authorizing Provider:    Kwaku Khan, [address] [place] [address]   Phone:  [phone]   Fax:  [phone]   MARIANNA #:  [id number]   NPI:  [id number]

## 2022-03-08 NOTE — PROGRESS NOTES
Dose is correct, may deliver to patient. Please have patient schedule F2F appointment for DM check and labs.

## 2022-03-18 PROBLEM — E66.01 SEVERE OBESITY (HCC): Status: ACTIVE | Noted: 2019-03-22

## 2022-03-18 NOTE — PROGRESS NOTES
03-16-22: T/C made to the patient to schedule delivery of her PAP order of Januvia. There was no answer so a message was left asking for a return call to nurse Clark at the [address] [place] [address], [phone], about a medication order.  Cynthia Baxter RN

## 2022-03-20 PROBLEM — E11.21 TYPE 2 DIABETES WITH NEPHROPATHY (HCC): Status: ACTIVE | Noted: 2019-06-10

## 2022-04-11 ENCOUNTER — TELEPHONE (OUTPATIENT)
Dept: FAMILY MEDICINE CLINIC | Age: 62
End: 2022-04-11

## 2022-04-11 NOTE — TELEPHONE ENCOUNTER
03-31-22: T/C made to the patient to schedule delivery of her PAP order of Januvia. There was no answer, so a message was left asking for a return call to nurse Clark at the [address] [place] [address], [phone], about a medication order. 04-02-22: T/C made to the patient to schedule delivery of her PAP order of Januvia. The patient agreed to a delivery at her home in the afternoon and confirmed her address as the one listed in [address] [place] [address]. Patient's PAP order of Januvia was delivered to her  at their home as planned. We reviewed the provider's dose instructions and a reminder to please call me in two months to request a refill. Due to Covid-19, the PAP med pick-up slip was not signed.  Shoaib Goddard RN

## 2022-06-13 ENCOUNTER — DOCUMENTATION ONLY (OUTPATIENT)
Dept: FAMILY MEDICINE CLINIC | Age: 62
End: 2022-06-13

## 2022-06-13 NOTE — PROGRESS NOTES
Dose is correct, may deliver to patient. Patient is overdue to diabetic visit. Please schedule for F2F visit.

## 2022-06-13 NOTE — PROGRESS NOTES
Patient's PAP order of one bottle of Januvia 100mg, 90 tabs/bottle, was delivered today to the [address] [place] [address] office. Routing to Dr. Flores Fuller for dose confirmation and permission to deliver to the patient. Amber Soto RN    SITagliptin (JANUVIA) 100 mg tablet [[id number]]     Order Details  Dose: 100 mg Route: Oral Frequency: DAILY   Dispense Quantity: 90 Tablet Refills: 3          Sig: Take 1 Tablet by mouth daily.  [facility] medication, no print         Start Date: 10/06/21 End Date: --   Written Date: 10/06/21 Expiration Date: --       Diagnosis Association: DM type 2, goal A1C to be determined [facility]) (E11.9)     Providers    Authorizing Provider:    Amanda Hurd, [address] [place] [address]   Phone:  [phone]   Fax:  [phone]   MARIANNA #:  [id number]   NPI:  [id number]

## 2022-06-16 ENCOUNTER — OFFICE VISIT (OUTPATIENT)
Dept: FAMILY MEDICINE CLINIC | Age: 62
End: 2022-06-16

## 2022-06-16 ENCOUNTER — HOSPITAL ENCOUNTER (OUTPATIENT)
Dept: LAB | Age: 62
Discharge: HOME | End: 2022-06-16

## 2022-06-16 VITALS
HEIGHT: 65 IN | OXYGEN SATURATION: 100 % | BODY MASS INDEX: 36.32 KG/M2 | DIASTOLIC BLOOD PRESSURE: 91 MMHG | TEMPERATURE: 97.7 F | HEART RATE: 82 BPM | WEIGHT: 218 LBS | SYSTOLIC BLOOD PRESSURE: 173 MMHG

## 2022-06-16 DIAGNOSIS — R80.9 TYPE 2 DIABETES MELLITUS WITH MICROALBUMINURIA, WITHOUT LONG-TERM CURRENT USE OF INSULIN (HCC): ICD-10-CM

## 2022-06-16 DIAGNOSIS — I10 ESSENTIAL HYPERTENSION: ICD-10-CM

## 2022-06-16 DIAGNOSIS — E55.9 VITAMIN D DEFICIENCY: ICD-10-CM

## 2022-06-16 DIAGNOSIS — E78.00 PURE HYPERCHOLESTEROLEMIA: ICD-10-CM

## 2022-06-16 DIAGNOSIS — E11.29 TYPE 2 DIABETES MELLITUS WITH MICROALBUMINURIA, WITHOUT LONG-TERM CURRENT USE OF INSULIN (HCC): ICD-10-CM

## 2022-06-16 DIAGNOSIS — R80.9 TYPE 2 DIABETES MELLITUS WITH MICROALBUMINURIA, WITHOUT LONG-TERM CURRENT USE OF INSULIN (HCC): Primary | ICD-10-CM

## 2022-06-16 DIAGNOSIS — E11.29 TYPE 2 DIABETES MELLITUS WITH MICROALBUMINURIA, WITHOUT LONG-TERM CURRENT USE OF INSULIN (HCC): Primary | ICD-10-CM

## 2022-06-16 LAB
ALBUMIN SERPL-MCNC: 4 G/DL (ref 3.5–5)
ALBUMIN/GLOB SERPL: 1.3 {RATIO} (ref 1.1–2.2)
ALP SERPL-CCNC: 144 U/L (ref 45–117)
ALT SERPL-CCNC: 41 U/L (ref 12–78)
ANION GAP SERPL CALC-SCNC: 8 MMOL/L (ref 5–15)
AST SERPL-CCNC: 34 U/L (ref 15–37)
BILIRUB SERPL-MCNC: 0.4 MG/DL (ref 0.2–1)
BUN SERPL-MCNC: 12 MG/DL (ref 6–20)
BUN/CREAT SERPL: 15 (ref 12–20)
CALCIUM SERPL-MCNC: 9.5 MG/DL (ref 8.5–10.1)
CHLORIDE SERPL-SCNC: 101 MMOL/L (ref 97–108)
CHOLEST SERPL-MCNC: 297 MG/DL
CO2 SERPL-SCNC: 26 MMOL/L (ref 21–32)
CREAT SERPL-MCNC: 0.82 MG/DL (ref 0.55–1.02)
CREAT UR-MCNC: 128 MG/DL
ERYTHROCYTE [DISTWIDTH] IN BLOOD BY AUTOMATED COUNT: 12.9 % (ref 11.5–14.5)
EST. AVERAGE GLUCOSE BLD GHB EST-MCNC: 243 MG/DL
GLOBULIN SER CALC-MCNC: 3.2 G/DL (ref 2–4)
GLUCOSE POC: NORMAL MG/DL
GLUCOSE SERPL-MCNC: 292 MG/DL (ref 65–100)
HBA1C MFR BLD: 10.1 % (ref 4–5.6)
HCT VFR BLD AUTO: 44.8 % (ref 35–47)
HDLC SERPL-MCNC: 57 MG/DL
HDLC SERPL: 5.2 {RATIO} (ref 0–5)
HGB BLD-MCNC: 14 G/DL (ref 11.5–16)
LDLC SERPL CALC-MCNC: 205.4 MG/DL (ref 0–100)
MCH RBC QN AUTO: 26.2 PG (ref 26–34)
MCHC RBC AUTO-ENTMCNC: 31.3 G/DL (ref 30–36.5)
MCV RBC AUTO: 83.7 FL (ref 80–99)
MICROALBUMIN UR-MCNC: 18.8 MG/DL
MICROALBUMIN/CREAT UR-RTO: 147 MG/G (ref 0–30)
NRBC # BLD: 0 K/UL (ref 0–0.01)
NRBC BLD-RTO: 0 PER 100 WBC
PLATELET # BLD AUTO: 330 K/UL (ref 150–400)
PMV BLD AUTO: 10.4 FL (ref 8.9–12.9)
POTASSIUM SERPL-SCNC: 4.2 MMOL/L (ref 3.5–5.1)
PROT SERPL-MCNC: 7.2 G/DL (ref 6.4–8.2)
RBC # BLD AUTO: 5.35 M/UL (ref 3.8–5.2)
SODIUM SERPL-SCNC: 135 MMOL/L (ref 136–145)
TRIGL SERPL-MCNC: 173 MG/DL (ref ?–150)
TSH SERPL DL<=0.05 MIU/L-ACNC: 1.03 UIU/ML (ref 0.36–3.74)
VIT B12 SERPL-MCNC: 538 PG/ML (ref 193–986)
VLDLC SERPL CALC-MCNC: 34.6 MG/DL
WBC # BLD AUTO: 6.3 K/UL (ref 3.6–11)

## 2022-06-16 PROCEDURE — 82306 VITAMIN D 25 HYDROXY: CPT

## 2022-06-16 PROCEDURE — 83036 HEMOGLOBIN GLYCOSYLATED A1C: CPT

## 2022-06-16 PROCEDURE — 82607 VITAMIN B-12: CPT

## 2022-06-16 PROCEDURE — 82962 GLUCOSE BLOOD TEST: CPT | Performed by: FAMILY MEDICINE

## 2022-06-16 PROCEDURE — 85027 COMPLETE CBC AUTOMATED: CPT

## 2022-06-16 PROCEDURE — 84443 ASSAY THYROID STIM HORMONE: CPT

## 2022-06-16 PROCEDURE — 99214 OFFICE O/P EST MOD 30 MIN: CPT | Performed by: FAMILY MEDICINE

## 2022-06-16 PROCEDURE — 82043 UR ALBUMIN QUANTITATIVE: CPT

## 2022-06-16 PROCEDURE — 80053 COMPREHEN METABOLIC PANEL: CPT

## 2022-06-16 PROCEDURE — 80061 LIPID PANEL: CPT

## 2022-06-16 RX ORDER — METFORMIN HYDROCHLORIDE 1000 MG/1
1000 TABLET ORAL 2 TIMES DAILY WITH MEALS
Qty: 180 TABLET | Refills: 3 | Status: SHIPPED | OUTPATIENT
Start: 2022-06-16

## 2022-06-16 RX ORDER — AMLODIPINE AND BENAZEPRIL HYDROCHLORIDE 5; 20 MG/1; MG/1
1 CAPSULE ORAL DAILY
Qty: 90 CAPSULE | Refills: 1 | Status: SHIPPED | OUTPATIENT
Start: 2022-06-16

## 2022-06-16 NOTE — PROGRESS NOTES
I have printed AVS and reviewed it with patient today. Patient verbalized understanding. I reviewed with patient medications sent to pharmacy and how the medication is taken. Patient verbalized understanding. The patient was given coupons for prescriptions and I explained how the coupons are used. Patient verbalized understanding. I instructed patient to schedule a follow-up appointment prior to leaving today. Patient verbalized understanding. Patient correctly stated her full name and date of birth prior to the information shared. No  was needed.   Mele Malloy RN

## 2022-06-16 NOTE — PROGRESS NOTES
Flaco Fair (: 1960) is a 63 y.o. female, established patient, here for evaluation of the following chief complaint(s):  Medication Refill       ASSESSMENT/PLAN:  1. Type 2 diabetes mellitus with microalbuminuria, without long-term current use of insulin (HCC)  Uncontrolled, resume Glucophage. Discussed healthy options to cope with stress eating.  -     AMB POC GLUCOSE BLOOD, BY GLUCOSE MONITORING DEVICE  -     metFORMIN (GLUCOPHAGE) 1,000 mg tablet; Take 1 Tablet by mouth two (2) times daily (with meals). , Normal, Disp-180 Tablet, R-3  -     HEMOGLOBIN A1C WITH EAG; Future  -     MICROALBUMIN, UR, RAND W/ MICROALB/CREAT RATIO; Future  -     VITAMIN B12; Future  -     HM DIABETES FOOT EXAM  2. Essential hypertension  Not controlled. With her hx of elevated microalbumin, will change Norvasc to Lotrel.  -     CBC W/O DIFF; Future  -     METABOLIC PANEL, COMPREHENSIVE; Future  -     TSH 3RD GENERATION; Future  3. Pure hypercholesterolemia  Reviewed SE and benefits of statins. She will restart Lipitor. Discussed diet. -     LIPID PANEL; Future  4. Vitamin D deficiency  -     VITAMIN D, 25 HYDROXY; Future      Follow-up and Dispositions    · Return for follow up for F2F in 3 months for DM check, VV in 1-2 weeks for lab review. SUBJECTIVE:  HPI  Has been out of medication for at least 30 days. Has not followed up in a year. DM:  Patient is taking Januvia most mornings. Off Glucophage x 1 month because she ran out. Checking BS sporadically. Poor diet for past 3 months due to stress. Occasional paresthesias in Rt foot. HTN:  Patient is out of Norvasc. HLD:  Patient is stopped Lipitor due to concerns with medication she heard about. Review of Systems   Constitutional: Negative for diaphoresis, fatigue and fever. Respiratory: Negative for cough, chest tightness and shortness of breath. Cardiovascular: Negative for chest pain, palpitations and leg swelling.    Neurological: Negative for syncope and light-headedness. OBJECTIVE:  Blood pressure (!) 173/91, pulse 82, temperature 97.7 °F (36.5 °C), temperature source Temporal, height 5' 4.96\" (1.65 m), weight 218 lb (98.9 kg), SpO2 100 %. Physical Exam  CONSTITUTIONAL:  Well developed. No apparent distress. PSYCHIATRIC: Oriented to time, place, person & situation. Appropriate mood and affect. NECK:  Normal inspection, normal palpation without any lymphadenopathy, masses, or thyromegaly  CARDIOVASCULAR:  Regular rate and rhythm. Normal S1, S2. No extra sounds. RESPIRATORY:  Normal effort. Normal ascultation without wheezing. VASCULAR:  Normal Carotid pulses without bruits. Normal Posterior Tibialis pulses. NEUROLOGICAL:  Filament testing of feet and toes is normal.  EXTREMITIES:  No edema. Feet without sores or calluses. Results for orders placed or performed in visit on 06/16/22   AMB POC GLUCOSE BLOOD, BY GLUCOSE MONITORING DEVICE   Result Value Ref Range    Glucose  f MG/DL         An electronic signature was used to authenticate this note.   -- Catherine Hooker MD

## 2022-06-16 NOTE — PROGRESS NOTES
Results for orders placed or performed in visit on 06/16/22   AMB POC GLUCOSE BLOOD, BY GLUCOSE MONITORING DEVICE   Result Value Ref Range    Glucose  f MG/DL     Coordination of Care  1. Have you been to the ER, urgent care clinic since your last visit? Hospitalized since your last visit? No    2. Have you seen or consulted any other health care providers outside of the [address] [place] [address] since your last visit? Include any pap smears or colon screening. No    Does the patient need refills? N/A    Learning Assessment Complete?  yes

## 2022-06-17 LAB — 25(OH)D3 SERPL-MCNC: 24.6 NG/ML (ref 30–100)

## 2022-07-05 ENCOUNTER — VIRTUAL VISIT (OUTPATIENT)
Dept: FAMILY MEDICINE CLINIC | Age: 62
End: 2022-07-05

## 2022-07-05 DIAGNOSIS — R80.9 TYPE 2 DIABETES MELLITUS WITH MICROALBUMINURIA, WITHOUT LONG-TERM CURRENT USE OF INSULIN (HCC): Primary | ICD-10-CM

## 2022-07-05 DIAGNOSIS — E55.9 VITAMIN D DEFICIENCY: ICD-10-CM

## 2022-07-05 DIAGNOSIS — E11.29 TYPE 2 DIABETES MELLITUS WITH MICROALBUMINURIA, WITHOUT LONG-TERM CURRENT USE OF INSULIN (HCC): Primary | ICD-10-CM

## 2022-07-05 DIAGNOSIS — I10 ESSENTIAL HYPERTENSION: ICD-10-CM

## 2022-07-05 DIAGNOSIS — E78.00 PURE HYPERCHOLESTEROLEMIA: ICD-10-CM

## 2022-07-05 PROCEDURE — 99442 PR PHYS/QHP TELEPHONE EVALUATION 11-20 MIN: CPT | Performed by: FAMILY MEDICINE

## 2022-07-05 RX ORDER — ERGOCALCIFEROL 1.25 MG/1
50000 CAPSULE ORAL
Qty: 12 CAPSULE | Refills: 1 | Status: SHIPPED | OUTPATIENT
Start: 2022-07-05

## 2022-07-05 RX ORDER — ATORVASTATIN CALCIUM 20 MG/1
20 TABLET, FILM COATED ORAL DAILY
Qty: 90 TABLET | Refills: 3 | Status: SHIPPED | OUTPATIENT
Start: 2022-07-05

## 2022-07-05 NOTE — PROGRESS NOTES
Tc to the pt for discharge instructions. Told pt that rx's have been sent to pharmacy and they should be ready for  in approximately 2 hrs. Pt told to please present [employer] com coupon which I texted to her phone, to the pharmacy to receive discounted price. The pt verified understanding.  Jason Dyer RN

## 2022-07-05 NOTE — PROGRESS NOTES
BS = 206. Fasting yesterday. BS= 205. Fasting. Coordination of Care  1. Have you been to the ER, urgent care clinic since your last visit? Hospitalized since your last visit? No    2. Have you seen or consulted any other health care providers outside of the [address] [place] [address] since your last visit? Include any pap smears or colon screening. No    Does the patient need refills?  YES    Learning Assessment Complete? no  Depression Screening complete in the past 12 months? yes

## 2022-07-05 NOTE — PROGRESS NOTES
06-16-22: Patient's PAP refill order of Januvia was delivered to her at the [facility] clinic before her provider appointment. We reviewed the provider's dose instructions and that she will need to return to me a new PAP application before we can order any additional Januvia for her. The patient expressed understanding and had no questions. Due to Covid-19 precautions, the PAP med pick-up slip was not signed.  Devin Yepez RN

## 2022-10-14 ENCOUNTER — OFFICE VISIT (OUTPATIENT)
Dept: FAMILY MEDICINE CLINIC | Age: 62
End: 2022-10-14

## 2022-10-14 ENCOUNTER — HOSPITAL ENCOUNTER (OUTPATIENT)
Dept: LAB | Age: 62
Discharge: HOME | End: 2022-10-14

## 2022-10-14 VITALS
OXYGEN SATURATION: 99 % | BODY MASS INDEX: 36.65 KG/M2 | HEART RATE: 83 BPM | HEIGHT: 65 IN | DIASTOLIC BLOOD PRESSURE: 78 MMHG | TEMPERATURE: 98.2 F | WEIGHT: 220 LBS | SYSTOLIC BLOOD PRESSURE: 136 MMHG

## 2022-10-14 DIAGNOSIS — E11.29 TYPE 2 DIABETES MELLITUS WITH MICROALBUMINURIA, WITHOUT LONG-TERM CURRENT USE OF INSULIN (HCC): Primary | ICD-10-CM

## 2022-10-14 DIAGNOSIS — E78.00 PURE HYPERCHOLESTEROLEMIA: ICD-10-CM

## 2022-10-14 DIAGNOSIS — Z11.1 SCREENING FOR TUBERCULOSIS: ICD-10-CM

## 2022-10-14 DIAGNOSIS — E11.29 TYPE 2 DIABETES MELLITUS WITH MICROALBUMINURIA, WITHOUT LONG-TERM CURRENT USE OF INSULIN (HCC): ICD-10-CM

## 2022-10-14 DIAGNOSIS — Z23 NEEDS FLU SHOT: ICD-10-CM

## 2022-10-14 DIAGNOSIS — I10 ESSENTIAL HYPERTENSION: ICD-10-CM

## 2022-10-14 DIAGNOSIS — R80.9 TYPE 2 DIABETES MELLITUS WITH MICROALBUMINURIA, WITHOUT LONG-TERM CURRENT USE OF INSULIN (HCC): Primary | ICD-10-CM

## 2022-10-14 DIAGNOSIS — R80.9 TYPE 2 DIABETES MELLITUS WITH MICROALBUMINURIA, WITHOUT LONG-TERM CURRENT USE OF INSULIN (HCC): ICD-10-CM

## 2022-10-14 LAB — GLUCOSE POC: NORMAL MG/DL

## 2022-10-14 PROCEDURE — 90471 IMMUNIZATION ADMIN: CPT

## 2022-10-14 PROCEDURE — 86480 TB TEST CELL IMMUN MEASURE: CPT

## 2022-10-14 PROCEDURE — 90686 IIV4 VACC NO PRSV 0.5 ML IM: CPT

## 2022-10-14 PROCEDURE — 83036 HEMOGLOBIN GLYCOSYLATED A1C: CPT

## 2022-10-14 PROCEDURE — 99214 OFFICE O/P EST MOD 30 MIN: CPT | Performed by: FAMILY MEDICINE

## 2022-10-14 PROCEDURE — 3046F HEMOGLOBIN A1C LEVEL >9.0%: CPT | Performed by: FAMILY MEDICINE

## 2022-10-14 PROCEDURE — 80053 COMPREHEN METABOLIC PANEL: CPT

## 2022-10-14 PROCEDURE — 82962 GLUCOSE BLOOD TEST: CPT | Performed by: FAMILY MEDICINE

## 2022-10-14 PROCEDURE — 80061 LIPID PANEL: CPT

## 2022-10-14 PROCEDURE — 36415 COLL VENOUS BLD VENIPUNCTURE: CPT

## 2022-10-14 RX ORDER — DULAGLUTIDE 0.75 MG/.5ML
0.75 INJECTION, SOLUTION SUBCUTANEOUS
Qty: 12 PEN | Refills: 3
Start: 2022-10-14

## 2022-10-14 NOTE — PROGRESS NOTES
Pilar Triana (: 1960) is a 63 y.o. female, established patient, here for evaluation of the following chief complaint(s):  Diabetes (/Follow up), Other (Patient needs a TB screening for foster care.), and Immunization/Injection (FLU vaccine)       ASSESSMENT/PLAN:  1. Type 2 diabetes mellitus with microalbuminuria, without long-term current use of insulin (HCC)  Uncontrolled. She admits her diet could be better but has difficulty completely eliminating sweets. Recommended advancing therapy: Trulicity vs Invokana. Pt is reluctant to add another pill as she frequently forgets her current meds. Will start Trulicity through [facility], continue Januvia until Trulicity is received. -     AMB POC GLUCOSE BLOOD, BY GLUCOSE MONITORING DEVICE  -     HEMOGLOBIN A1C WITH EAG; Future  2. Essential hypertension  Improved, continue with current medication  -     METABOLIC PANEL, COMPREHENSIVE; Future  3. Pure hypercholesterolemia  Tolerating Lipitor.  -     LIPID PANEL; Future  4. Screening for tuberculosis  -     QUANTIFERON-TB PLUS(CLIENT INCUB.); Future  5. Needs flu shot  -     INFLUENZA, FLUARIX, FLULAVAL, FLUZONE (AGE 6 MO+), AFLURIA(AGE 3Y+) IM, PF, 0.5 ML      Follow-up and Dispositions    Return for follow up for F2F in 3 months for DM. SUBJECTIVE:  DM:  Patient is taking Januvia, Metformin BID but misses doses frequently. Checking BS sporadically: 200+. Is trying to do better with her diet but does eat sweats at time. HTN:  Patient is taking Lotrel without any side effects. HLD:  Patient is taking Lipitor regularly. Patient needs physical form and TB screen for foster care. Review of Systems   Constitutional:  Negative for diaphoresis, fatigue and fever. Respiratory:  Negative for cough, chest tightness and shortness of breath. Cardiovascular:  Negative for chest pain, palpitations and leg swelling. Endocrine: Negative for polyuria.    Neurological:  Negative for syncope and light-headedness. OBJECTIVE:  Blood pressure 136/78, pulse 83, temperature 98.2 °F (36.8 °C), temperature source Temporal, height 5' 4.96\" (1.65 m), weight 220 lb (99.8 kg), SpO2 99 %. Physical Exam  CONSTITUTIONAL:  Well developed. No apparent distress. PSYCHIATRIC: Oriented to time, place, person & situation. Appropriate mood and affect. NECK:  Normal inspection, normal palpation without any lymphadenopathy, masses, or thyromegaly  CARDIOVASCULAR:  Regular rate and rhythm. Normal S1, S2. No extra sounds. RESPIRATORY:  Normal effort. Normal ascultation without wheezing. VASCULAR:  Normal Carotid pulses without bruits. Normal Posterior Tibialis pulses. EXTREMITIES:  No edema. Results for orders placed or performed in visit on 10/14/22   AMB POC GLUCOSE BLOOD, BY GLUCOSE MONITORING DEVICE   Result Value Ref Range    Glucose POC fasting 235 MG/DL         An electronic signature was used to authenticate this note.   -- Raegan Horvath MD

## 2022-10-14 NOTE — PROGRESS NOTES
Name and  confirmed w/ patient. An After Visit Summary was provided and all discharge instructions were reviewed with the patient including: new medications, f/up appt, and PAP application. A lab requisition was given to the patient along with instructions to the draw site downstairs. Time for questions and answers provided, patient verbalized understanding. Physical and TB screening received via fax, filled out by Dr. Maribeth Yuen and returned to pt. Vaccine administered per pt request after obtaining consent, confirming VIS understanding, and ruling out contraindications. Pt instructed about signs of allergic reactions and when to seek emergency care. Pt verbalized understanding. Pt tolerated vaccination well, no adverse effects noted. Recorded in EMR and VIIS. Pt instructed to stay in waiting area for 15 min for observation. Pt discharged in stable condition.

## 2022-10-14 NOTE — PATIENT INSTRUCTIONS
Vaccine Information Statement    Influenza (Flu) Vaccine (Inactivated or Recombinant): What You Need to Know    Many vaccine information statements are available in French and other languages. See www.immunize.org/vis. Hojas de información sobre vacunas están disponibles en español y en muchos otros idiomas. Visite www.immunize.org/vis. 1. Why get vaccinated? Influenza vaccine can prevent influenza (flu). Flu is a contagious disease that spreads around the [facility] every year, usually between October and May. Anyone can get the flu, but it is more dangerous for some people. Infants and young children, people 72 years and older, pregnant people, and people with certain health conditions or a weakened immune system are at greatest risk of flu complications. Pneumonia, bronchitis, sinus infections, and ear infections are examples of flu-related complications. If you have a medical condition, such as heart disease, cancer, or diabetes, flu can make it worse. Flu can cause fever and chills, sore throat, muscle aches, fatigue, cough, headache, and runny or stuffy nose. Some people may have vomiting and diarrhea, though this is more common in children than adults. In an average year, thousands of people in the [facility] die from flu, and many more are hospitalized. Flu vaccine prevents millions of illnesses and flu-related visits to the doctor each year. 2. Influenza vaccines     CDC recommends everyone 6 months and older get vaccinated every flu season. Children 6 months through 6years of age may need 2 doses during a single flu season. Everyone else needs only 1 dose each flu season. It takes about 2 weeks for protection to develop after vaccination. There are many flu viruses, and they are always changing. Each year a new flu vaccine is made to protect against the influenza viruses believed to be likely to cause disease in the upcoming flu season.  Even when the vaccine doesnt exactly match these viruses, it may still provide some protection. Influenza vaccine does not cause flu. Influenza vaccine may be given at the same time as other vaccines. 3. Talk with your health care provider    Tell your vaccination provider if the person getting the vaccine:  Has had an allergic reaction after a previous dose of influenza vaccine, or has any severe, life-threatening allergies   Has ever had Guillain-Barré Syndrome (also called GBS)    In some cases, your health care provider may decide to postpone influenza vaccination until a future visit. Influenza vaccine can be administered at any time during pregnancy. People who are or will be pregnant during influenza season should receive inactivated influenza vaccine. People with minor illnesses, such as a cold, may be vaccinated. People who are moderately or severely ill should usually wait until they recover before getting influenza vaccine. Your health care provider can give you more information. 4. Risks of a vaccine reaction    Soreness, redness, and swelling where the shot is given, fever, muscle aches, and headache can happen after influenza vaccination. There may be a very small increased risk of Guillain-Barré Syndrome (GBS) after inactivated influenza vaccine (the flu shot). Ardine Gum children who get the flu shot along with pneumococcal vaccine (PCV13) and/or DTaP vaccine at the same time might be slightly more likely to have a seizure caused by fever. Tell your health care provider if a child who is getting flu vaccine has ever had a seizure. People sometimes faint after medical procedures, including vaccination. Tell your provider if you feel dizzy or have vision changes or ringing in the ears. As with any medicine, there is a very remote chance of a vaccine causing a severe allergic reaction, other serious injury, or death. 5. What if there is a serious problem?     An allergic reaction could occur after the vaccinated person leaves the clinic. If you see signs of a severe allergic reaction (hives, swelling of the face and throat, difficulty breathing, a fast heartbeat, dizziness, or weakness), call 9-1-1 and get the person to the nearest hospital.    For other signs that concern you, call your health care provider. Adverse reactions should be reported to the Vaccine Adverse Event Reporting System (VAERS). Your health care provider will usually file this report, or you can do it yourself. Visit the VAERS website at www.vaers. [facility].gov or call 3-394.187.6022. VAERS is only for reporting reactions, and VAERS staff members do not give medical advice. 6. The National Vaccine Injury Compensation Program    The [facility] Vaccine Injury Compensation Program (VICP) is a federal program that was created to compensate people who may have been injured by certain vaccines. Claims regarding alleged injury or death due to vaccination have a time limit for filing, which may be as short as two years. Visit the VICP website at www.[facility].gov/vaccinecompensation or call 8-570.517.8242 to learn about the program and about filing a claim. 7. How can I learn more? Ask your health care provider. Call your local or state health department. Visit the website of the Food and Drug Administration (FDA) for vaccine package inserts and additional information at www.fda.gov/vaccines-blood-biologics/vaccines. Contact the Centers for Disease Control and Prevention (CDC): Call 5-856.315.6283 (4-965-NPJ-INFO) or  Visit CDCs influenza website at www.cdc.gov/flu. Vaccine Information Statement   Inactivated Influenza Vaccine   8/6/2021  42 PAU Sanderson [id number]-04   Department of Health and Human Services  Centers for Disease Control and Prevention    Office Use Only

## 2022-10-14 NOTE — PROGRESS NOTES
Coordination of Care  1. Have you been to the ER, urgent care clinic since your last visit? Hospitalized since your last visit? No    2. Have you seen or consulted any other health care providers outside of the [address] [place] [address] since your last visit? Include any pap smears or colon screening. No    Does the patient need refills? YES    Learning Assessment Complete?  yes  Depression Screening complete in the past 12 months? yes  Results for orders placed or performed in visit on 10/14/22   AMB POC GLUCOSE BLOOD, BY GLUCOSE MONITORING DEVICE   Result Value Ref Range    Glucose POC fasting 235 MG/DL

## 2022-10-15 LAB
ALBUMIN SERPL-MCNC: 3.9 G/DL (ref 3.5–5)
ALBUMIN/GLOB SERPL: 1.3 {RATIO} (ref 1.1–2.2)
ALP SERPL-CCNC: 101 U/L (ref 45–117)
ALT SERPL-CCNC: 58 U/L (ref 12–78)
ANION GAP SERPL CALC-SCNC: 9 MMOL/L (ref 5–15)
AST SERPL-CCNC: 44 U/L (ref 15–37)
BILIRUB SERPL-MCNC: 0.4 MG/DL (ref 0.2–1)
BUN SERPL-MCNC: 13 MG/DL (ref 6–20)
BUN/CREAT SERPL: 17 (ref 12–20)
CALCIUM SERPL-MCNC: 9 MG/DL (ref 8.5–10.1)
CHLORIDE SERPL-SCNC: 105 MMOL/L (ref 97–108)
CHOLEST SERPL-MCNC: 153 MG/DL
CO2 SERPL-SCNC: 23 MMOL/L (ref 21–32)
CREAT SERPL-MCNC: 0.75 MG/DL (ref 0.55–1.02)
EST. AVERAGE GLUCOSE BLD GHB EST-MCNC: 220 MG/DL
GLOBULIN SER CALC-MCNC: 3 G/DL (ref 2–4)
GLUCOSE SERPL-MCNC: 204 MG/DL (ref 65–100)
HBA1C MFR BLD: 9.3 % (ref 4–5.6)
HDLC SERPL-MCNC: 40 MG/DL
HDLC SERPL: 3.8 {RATIO} (ref 0–5)
LDLC SERPL CALC-MCNC: 88.2 MG/DL (ref 0–100)
POTASSIUM SERPL-SCNC: 4.4 MMOL/L (ref 3.5–5.1)
PROT SERPL-MCNC: 6.9 G/DL (ref 6.4–8.2)
SODIUM SERPL-SCNC: 137 MMOL/L (ref 136–145)
TRIGL SERPL-MCNC: 124 MG/DL (ref ?–150)
VLDLC SERPL CALC-MCNC: 24.8 MG/DL

## 2022-10-21 LAB
M TB IFN-G BLD-IMP: NEGATIVE
QUANTIFERON CRITERIA, QFI1T: NORMAL
QUANTIFERON MITOGEN VALUE: >10 IU/ML
QUANTIFERON NIL VALUE: 0.35 IU/ML
QUANTIFERON TB1 AG: 0.08 IU/ML
QUANTIFERON TB2 AG: 0.07 IU/ML

## 2022-10-25 NOTE — PROGRESS NOTES
Spoke with patient, reviewed labs. DM is better but not controlled. Would recommend pursuing Trulicity as we discussed at [address]. Cholesterol is controlled. Other labs are stable.

## 2023-02-22 RX ORDER — AMLODIPINE AND BENAZEPRIL HYDROCHLORIDE 5; 20 MG/1; MG/1
1 CAPSULE ORAL DAILY
Qty: 90 CAPSULE | Refills: 1 | Status: SHIPPED | OUTPATIENT
Start: 2023-02-22

## 2023-02-22 NOTE — TELEPHONE ENCOUNTER
Routed the refill request from the Pharmacy for AML/Benzapril 5-20 mg to the provider to review. Last ov 10/14/22. Supposed to return 3 months.  Ana Blair RN

## 2023-04-30 RX ORDER — ATORVASTATIN CALCIUM 20 MG/1
TABLET, FILM COATED ORAL DAILY
COMMUNITY
Start: 2022-07-05 | End: 2023-05-09 | Stop reason: SDUPTHER

## 2023-04-30 RX ORDER — ERGOCALCIFEROL 1.25 MG/1
CAPSULE ORAL
COMMUNITY
Start: 2022-07-05

## 2023-04-30 RX ORDER — LANCETS 30 GAUGE
EACH MISCELLANEOUS
COMMUNITY
Start: 2014-05-20

## 2023-04-30 RX ORDER — AMLODIPINE BESYLATE AND BENAZEPRIL HYDROCHLORIDE 5; 20 MG/1; MG/1
1 CAPSULE ORAL DAILY
COMMUNITY
Start: 2023-02-22 | End: 2023-05-09 | Stop reason: SDUPTHER

## 2023-04-30 RX ORDER — DULAGLUTIDE 0.75 MG/.5ML
INJECTION, SOLUTION SUBCUTANEOUS
COMMUNITY
Start: 2022-10-14 | End: 2023-05-09

## 2023-05-04 ENCOUNTER — HOSPITAL ENCOUNTER (OUTPATIENT)
Dept: LAB | Age: 63
Discharge: HOME | End: 2023-05-04

## 2023-05-04 ENCOUNTER — OFFICE VISIT (OUTPATIENT)
Dept: FAMILY MEDICINE CLINIC | Facility: CLINIC | Age: 63
End: 2023-05-04

## 2023-05-04 VITALS
OXYGEN SATURATION: 98 % | TEMPERATURE: 97.8 F | HEART RATE: 75 BPM | BODY MASS INDEX: 35.32 KG/M2 | HEIGHT: 65 IN | DIASTOLIC BLOOD PRESSURE: 88 MMHG | SYSTOLIC BLOOD PRESSURE: 132 MMHG | WEIGHT: 212 LBS

## 2023-05-04 DIAGNOSIS — E11.29 TYPE 2 DIABETES MELLITUS WITH MICROALBUMINURIA, WITHOUT LONG-TERM CURRENT USE OF INSULIN (HCC): ICD-10-CM

## 2023-05-04 DIAGNOSIS — H61.22 IMPACTED CERUMEN OF LEFT EAR: ICD-10-CM

## 2023-05-04 DIAGNOSIS — R80.9 TYPE 2 DIABETES MELLITUS WITH MICROALBUMINURIA, WITHOUT LONG-TERM CURRENT USE OF INSULIN (HCC): Primary | ICD-10-CM

## 2023-05-04 DIAGNOSIS — E11.29 TYPE 2 DIABETES MELLITUS WITH MICROALBUMINURIA, WITHOUT LONG-TERM CURRENT USE OF INSULIN (HCC): Primary | ICD-10-CM

## 2023-05-04 DIAGNOSIS — N76.0 ACUTE VAGINITIS: ICD-10-CM

## 2023-05-04 DIAGNOSIS — R80.9 TYPE 2 DIABETES MELLITUS WITH MICROALBUMINURIA, WITHOUT LONG-TERM CURRENT USE OF INSULIN (HCC): ICD-10-CM

## 2023-05-04 DIAGNOSIS — M79.671 PAIN OF RIGHT HEEL: ICD-10-CM

## 2023-05-04 LAB — GLUCOSE POC: 273 MG/DL

## 2023-05-04 PROCEDURE — 3079F DIAST BP 80-89 MM HG: CPT | Performed by: FAMILY MEDICINE

## 2023-05-04 PROCEDURE — 99214 OFFICE O/P EST MOD 30 MIN: CPT | Performed by: FAMILY MEDICINE

## 2023-05-04 PROCEDURE — 85027 COMPLETE CBC AUTOMATED: CPT

## 2023-05-04 PROCEDURE — 3075F SYST BP GE 130 - 139MM HG: CPT | Performed by: FAMILY MEDICINE

## 2023-05-04 PROCEDURE — 36415 COLL VENOUS BLD VENIPUNCTURE: CPT

## 2023-05-04 PROCEDURE — 80061 LIPID PANEL: CPT

## 2023-05-04 PROCEDURE — 82043 UR ALBUMIN QUANTITATIVE: CPT

## 2023-05-04 PROCEDURE — 80053 COMPREHEN METABOLIC PANEL: CPT

## 2023-05-04 PROCEDURE — 83036 HEMOGLOBIN GLYCOSYLATED A1C: CPT

## 2023-05-04 PROCEDURE — 82962 GLUCOSE BLOOD TEST: CPT | Performed by: FAMILY MEDICINE

## 2023-05-04 PROCEDURE — 84443 ASSAY THYROID STIM HORMONE: CPT

## 2023-05-04 RX ORDER — EXENATIDE 2 MG/.85ML
2 INJECTION, SUSPENSION, EXTENDED RELEASE SUBCUTANEOUS
Qty: 12 EACH | Refills: 3
Start: 2023-05-04

## 2023-05-05 LAB
ALBUMIN SERPL-MCNC: 4.1 G/DL (ref 3.5–5)
ALBUMIN/GLOB SERPL: 1.4 (ref 1.1–2.2)
ALP SERPL-CCNC: 110 U/L (ref 45–117)
ALT SERPL-CCNC: 59 U/L (ref 12–78)
ANION GAP SERPL CALC-SCNC: 6 MMOL/L (ref 5–15)
AST SERPL-CCNC: 48 U/L (ref 15–37)
BILIRUB SERPL-MCNC: 0.3 MG/DL (ref 0.2–1)
BUN SERPL-MCNC: 11 MG/DL (ref 6–20)
BUN/CREAT SERPL: 15 (ref 12–20)
CALCIUM SERPL-MCNC: 9.4 MG/DL (ref 8.5–10.1)
CHLORIDE SERPL-SCNC: 103 MMOL/L (ref 97–108)
CHOLEST SERPL-MCNC: 173 MG/DL
CO2 SERPL-SCNC: 26 MMOL/L (ref 21–32)
CREAT SERPL-MCNC: 0.74 MG/DL (ref 0.55–1.02)
CREAT UR-MCNC: 114 MG/DL
ERYTHROCYTE [DISTWIDTH] IN BLOOD BY AUTOMATED COUNT: 12.7 % (ref 11.5–14.5)
EST. AVERAGE GLUCOSE BLD GHB EST-MCNC: 278 MG/DL
GLOBULIN SER CALC-MCNC: 3 G/DL (ref 2–4)
GLUCOSE SERPL-MCNC: 268 MG/DL (ref 65–100)
HBA1C MFR BLD: 11.3 % (ref 4–5.6)
HCT VFR BLD AUTO: 47.3 % (ref 35–47)
HDLC SERPL-MCNC: 46 MG/DL
HDLC SERPL: 3.8 (ref 0–5)
HGB BLD-MCNC: 14.6 G/DL (ref 11.5–16)
LDLC SERPL CALC-MCNC: 103 MG/DL (ref 0–100)
MCH RBC QN AUTO: 26.3 PG (ref 26–34)
MCHC RBC AUTO-ENTMCNC: 30.9 G/DL (ref 30–36.5)
MCV RBC AUTO: 85.1 FL (ref 80–99)
MICROALBUMIN UR-MCNC: 3.22 MG/DL
MICROALBUMIN/CREAT UR-RTO: 28 MG/G (ref 0–30)
NRBC # BLD: 0 K/UL (ref 0–0.01)
NRBC BLD-RTO: 0 PER 100 WBC
PLATELET # BLD AUTO: 304 K/UL (ref 150–400)
PMV BLD AUTO: 10.4 FL (ref 8.9–12.9)
POTASSIUM SERPL-SCNC: 4 MMOL/L (ref 3.5–5.1)
PROT SERPL-MCNC: 7.1 G/DL (ref 6.4–8.2)
RBC # BLD AUTO: 5.56 M/UL (ref 3.8–5.2)
SODIUM SERPL-SCNC: 135 MMOL/L (ref 136–145)
TRIGL SERPL-MCNC: 120 MG/DL (ref ?–150)
TSH SERPL DL<=0.05 MIU/L-ACNC: 0.81 UIU/ML (ref 0.36–3.74)
VLDLC SERPL CALC-MCNC: 24 MG/DL
WBC # BLD AUTO: 6.1 K/UL (ref 3.6–11)

## 2023-05-09 ENCOUNTER — TELEMEDICINE (OUTPATIENT)
Age: 63
End: 2023-05-09

## 2023-05-09 DIAGNOSIS — E78.00 PURE HYPERCHOLESTEROLEMIA, UNSPECIFIED: ICD-10-CM

## 2023-05-09 DIAGNOSIS — E11.29 TYPE 2 DIABETES MELLITUS WITH OTHER DIABETIC KIDNEY COMPLICATION (HCC): Primary | ICD-10-CM

## 2023-05-09 PROCEDURE — 99443 PR PHYS/QHP TELEPHONE EVALUATION 21-30 MIN: CPT | Performed by: FAMILY MEDICINE

## 2023-05-09 RX ORDER — ORAL SEMAGLUTIDE 3 MG/1
3 TABLET ORAL DAILY
Qty: 90 TABLET | Refills: 3
Start: 2023-05-09

## 2023-05-09 RX ORDER — AMLODIPINE BESYLATE AND BENAZEPRIL HYDROCHLORIDE 5; 20 MG/1; MG/1
1 CAPSULE ORAL DAILY
Qty: 90 CAPSULE | Refills: 3 | Status: SHIPPED | OUTPATIENT
Start: 2023-05-09

## 2023-05-09 RX ORDER — ATORVASTATIN CALCIUM 20 MG/1
20 TABLET, FILM COATED ORAL DAILY
Qty: 90 TABLET | Refills: 3 | Status: SHIPPED | OUTPATIENT
Start: 2023-05-09

## 2023-05-09 ASSESSMENT — PATIENT HEALTH QUESTIONNAIRE - PHQ9
SUM OF ALL RESPONSES TO PHQ QUESTIONS 1-9: 0
SUM OF ALL RESPONSES TO PHQ QUESTIONS 1-9: 0
1. LITTLE INTEREST OR PLEASURE IN DOING THINGS: 0
SUM OF ALL RESPONSES TO PHQ QUESTIONS 1-9: 0
SUM OF ALL RESPONSES TO PHQ9 QUESTIONS 1 & 2: 0
SUM OF ALL RESPONSES TO PHQ QUESTIONS 1-9: 0
2. FEELING DOWN, DEPRESSED OR HOPELESS: 0

## 2023-05-09 NOTE — PROGRESS NOTES
I called the pt for discharge call. The pt had stated earlier she is at work in a training meeting. The pt was texted all coupons for all medicines ordered today that needed a coupon. She did not answer the phone. I left her a message asking she contact the nurse. I let her know I had texted some coupons for some of the medication ordered today to her.  Philippe Victor RN
The pt was called  for intake. She verified her name and . Pt needs refills on medication.
guardian's) consent. Patient identification was verified, and a caregiver was present when appropriate. The patient was located at Home: [place] [phone]  Provider was located at Home ([facility]): [place]     Patient identification was verified at the start of the visit: yes    Services were provided through a phone synchronous discussion virtually to substitute for in-person clinic visit. Patient was located at home and provider was located in office or at home. An electronic signature was used to authenticate this note.   -- Lonzell Sever, MD

## 2023-06-12 ENCOUNTER — TELEPHONE (OUTPATIENT)
Age: 63
End: 2023-06-12

## 2023-06-12 NOTE — TELEPHONE ENCOUNTER
Dilip Arredondo  Came to [place] 6/12/2023  requesting an apt with provider Perez since she been experience sore throat for the last couple of days  I couldn't give  her virtual since he's schedule is full for today patient will be waiting for a phone call .     Thank you   Eddy Monsalve

## 2023-07-07 ENCOUNTER — APPOINTMENT (OUTPATIENT)
Facility: HOSPITAL | Age: 63
DRG: 282 | End: 2023-07-07

## 2023-07-07 ENCOUNTER — HOSPITAL ENCOUNTER (INPATIENT)
Facility: HOSPITAL | Age: 63
LOS: 3 days | Discharge: HOME | DRG: 282 | End: 2023-07-10
Attending: EMERGENCY MEDICINE | Admitting: STUDENT IN AN ORGANIZED HEALTH CARE EDUCATION/TRAINING PROGRAM

## 2023-07-07 DIAGNOSIS — R07.9 CHEST PAIN WITH HIGH RISK FOR CARDIAC ETIOLOGY: Primary | ICD-10-CM

## 2023-07-07 DIAGNOSIS — I21.4 NSTEMI (NON-ST ELEVATED MYOCARDIAL INFARCTION) (HCC): ICD-10-CM

## 2023-07-07 DIAGNOSIS — R07.9 CHEST PAIN: ICD-10-CM

## 2023-07-07 DIAGNOSIS — E11.21 TYPE 2 DIABETES WITH NEPHROPATHY (HCC): ICD-10-CM

## 2023-07-07 PROBLEM — R77.8 ELEVATED TROPONIN: Status: ACTIVE | Noted: 2023-07-07

## 2023-07-07 PROBLEM — R79.89 ELEVATED TROPONIN: Status: ACTIVE | Noted: 2023-07-07

## 2023-07-07 LAB
ALBUMIN SERPL-MCNC: 3.7 G/DL (ref 3.5–5)
ALBUMIN/GLOB SERPL: 1 (ref 1.1–2.2)
ALP SERPL-CCNC: 115 U/L (ref 45–117)
ALT SERPL-CCNC: 32 U/L (ref 12–78)
ANION GAP SERPL CALC-SCNC: 5 MMOL/L (ref 5–15)
AST SERPL-CCNC: 29 U/L (ref 15–37)
BASOPHILS # BLD: 0.1 K/UL (ref 0–0.1)
BASOPHILS NFR BLD: 1 % (ref 0–1)
BILIRUB SERPL-MCNC: 0.4 MG/DL (ref 0.2–1)
BUN SERPL-MCNC: 15 MG/DL (ref 6–20)
BUN/CREAT SERPL: 17 (ref 12–20)
CALCIUM SERPL-MCNC: 9 MG/DL (ref 8.5–10.1)
CHLORIDE SERPL-SCNC: 104 MMOL/L (ref 97–108)
CO2 SERPL-SCNC: 25 MMOL/L (ref 21–32)
COMMENT:: NORMAL
CREAT SERPL-MCNC: 0.87 MG/DL (ref 0.55–1.02)
DIFFERENTIAL METHOD BLD: ABNORMAL
EOSINOPHIL # BLD: 0.1 K/UL (ref 0–0.4)
EOSINOPHIL NFR BLD: 1 % (ref 0–7)
ERYTHROCYTE [DISTWIDTH] IN BLOOD BY AUTOMATED COUNT: 12.8 % (ref 11.5–14.5)
GLOBULIN SER CALC-MCNC: 3.6 G/DL (ref 2–4)
GLUCOSE BLD STRIP.AUTO-MCNC: 266 MG/DL (ref 65–117)
GLUCOSE SERPL-MCNC: 307 MG/DL (ref 65–100)
HCT VFR BLD AUTO: 38.2 % (ref 35–47)
HGB BLD-MCNC: 12.2 G/DL (ref 11.5–16)
IMM GRANULOCYTES # BLD AUTO: 0 K/UL (ref 0–0.04)
IMM GRANULOCYTES NFR BLD AUTO: 0 % (ref 0–0.5)
LYMPHOCYTES # BLD: 2.7 K/UL (ref 0.8–3.5)
LYMPHOCYTES NFR BLD: 43 % (ref 12–49)
MAGNESIUM SERPL-MCNC: 1.7 MG/DL (ref 1.6–2.4)
MCH RBC QN AUTO: 25.7 PG (ref 26–34)
MCHC RBC AUTO-ENTMCNC: 31.9 G/DL (ref 30–36.5)
MCV RBC AUTO: 80.4 FL (ref 80–99)
MONOCYTES # BLD: 0.4 K/UL (ref 0–1)
MONOCYTES NFR BLD: 7 % (ref 5–13)
NEUTS SEG # BLD: 3.1 K/UL (ref 1.8–8)
NEUTS SEG NFR BLD: 48 % (ref 32–75)
NRBC # BLD: 0 K/UL (ref 0–0.01)
NRBC BLD-RTO: 0 PER 100 WBC
PLATELET # BLD AUTO: 312 K/UL (ref 150–400)
PMV BLD AUTO: 9.7 FL (ref 8.9–12.9)
POTASSIUM SERPL-SCNC: 3.9 MMOL/L (ref 3.5–5.1)
PROT SERPL-MCNC: 7.3 G/DL (ref 6.4–8.2)
RBC # BLD AUTO: 4.75 M/UL (ref 3.8–5.2)
SERVICE CMNT-IMP: ABNORMAL
SODIUM SERPL-SCNC: 134 MMOL/L (ref 136–145)
SPECIMEN HOLD: NORMAL
TROPONIN I SERPL HS-MCNC: 43 NG/L (ref 0–51)
TROPONIN I SERPL HS-MCNC: 5 NG/L (ref 0–51)
TSH SERPL DL<=0.05 MIU/L-ACNC: 1.63 UIU/ML (ref 0.36–3.74)
WBC # BLD AUTO: 6.4 K/UL (ref 3.6–11)

## 2023-07-07 PROCEDURE — 1100000000 HC RM PRIVATE

## 2023-07-07 PROCEDURE — 83735 ASSAY OF MAGNESIUM: CPT

## 2023-07-07 PROCEDURE — 84443 ASSAY THYROID STIM HORMONE: CPT

## 2023-07-07 PROCEDURE — 80053 COMPREHEN METABOLIC PANEL: CPT

## 2023-07-07 PROCEDURE — 6370000000 HC RX 637 (ALT 250 FOR IP): Performed by: EMERGENCY MEDICINE

## 2023-07-07 PROCEDURE — 84439 ASSAY OF FREE THYROXINE: CPT

## 2023-07-07 PROCEDURE — 83036 HEMOGLOBIN GLYCOSYLATED A1C: CPT

## 2023-07-07 PROCEDURE — 85025 COMPLETE CBC W/AUTO DIFF WBC: CPT

## 2023-07-07 PROCEDURE — 71046 X-RAY EXAM CHEST 2 VIEWS: CPT

## 2023-07-07 PROCEDURE — 2580000003 HC RX 258: Performed by: STUDENT IN AN ORGANIZED HEALTH CARE EDUCATION/TRAINING PROGRAM

## 2023-07-07 PROCEDURE — 36415 COLL VENOUS BLD VENIPUNCTURE: CPT

## 2023-07-07 PROCEDURE — 93005 ELECTROCARDIOGRAM TRACING: CPT

## 2023-07-07 PROCEDURE — 82962 GLUCOSE BLOOD TEST: CPT

## 2023-07-07 PROCEDURE — 99285 EMERGENCY DEPT VISIT HI MDM: CPT

## 2023-07-07 PROCEDURE — 84484 ASSAY OF TROPONIN QUANT: CPT

## 2023-07-07 PROCEDURE — 80061 LIPID PANEL: CPT

## 2023-07-07 RX ORDER — SODIUM CHLORIDE, SODIUM LACTATE, POTASSIUM CHLORIDE, CALCIUM CHLORIDE 600; 310; 30; 20 MG/100ML; MG/100ML; MG/100ML; MG/100ML
INJECTION, SOLUTION INTRAVENOUS CONTINUOUS
Status: DISCONTINUED | OUTPATIENT
Start: 2023-07-07 | End: 2023-07-09

## 2023-07-07 RX ORDER — ATORVASTATIN CALCIUM 20 MG/1
20 TABLET, FILM COATED ORAL DAILY
Status: DISCONTINUED | OUTPATIENT
Start: 2023-07-08 | End: 2023-07-08

## 2023-07-07 RX ORDER — INSULIN LISPRO 100 [IU]/ML
0-4 INJECTION, SOLUTION INTRAVENOUS; SUBCUTANEOUS NIGHTLY
Status: DISCONTINUED | OUTPATIENT
Start: 2023-07-07 | End: 2023-07-10 | Stop reason: HOSPADM

## 2023-07-07 RX ORDER — DEXTROSE MONOHYDRATE 100 MG/ML
INJECTION, SOLUTION INTRAVENOUS CONTINUOUS PRN
Status: DISCONTINUED | OUTPATIENT
Start: 2023-07-07 | End: 2023-07-10 | Stop reason: HOSPADM

## 2023-07-07 RX ORDER — LISINOPRIL 5 MG/1
5 TABLET ORAL DAILY
Status: DISCONTINUED | OUTPATIENT
Start: 2023-07-07 | End: 2023-07-07

## 2023-07-07 RX ORDER — LISINOPRIL 20 MG/1
20 TABLET ORAL DAILY
Status: DISCONTINUED | OUTPATIENT
Start: 2023-07-08 | End: 2023-07-10 | Stop reason: HOSPADM

## 2023-07-07 RX ORDER — AMLODIPINE BESYLATE AND BENAZEPRIL HYDROCHLORIDE 5; 20 MG/1; MG/1
1 CAPSULE ORAL DAILY
Status: DISCONTINUED | OUTPATIENT
Start: 2023-07-08 | End: 2023-07-07

## 2023-07-07 RX ORDER — INSULIN LISPRO 100 [IU]/ML
0-8 INJECTION, SOLUTION INTRAVENOUS; SUBCUTANEOUS
Status: DISCONTINUED | OUTPATIENT
Start: 2023-07-08 | End: 2023-07-10 | Stop reason: HOSPADM

## 2023-07-07 RX ORDER — ENOXAPARIN SODIUM 100 MG/ML
40 INJECTION SUBCUTANEOUS DAILY
Status: DISCONTINUED | OUTPATIENT
Start: 2023-07-08 | End: 2023-07-08

## 2023-07-07 RX ORDER — ASPIRIN 325 MG
325 TABLET ORAL
Status: COMPLETED | OUTPATIENT
Start: 2023-07-07 | End: 2023-07-07

## 2023-07-07 RX ORDER — ACETAMINOPHEN 325 MG/1
650 TABLET ORAL EVERY 4 HOURS PRN
Status: DISCONTINUED | OUTPATIENT
Start: 2023-07-07 | End: 2023-07-10 | Stop reason: HOSPADM

## 2023-07-07 RX ORDER — HYDRALAZINE HYDROCHLORIDE 20 MG/ML
10 INJECTION INTRAMUSCULAR; INTRAVENOUS EVERY 6 HOURS PRN
Status: DISCONTINUED | OUTPATIENT
Start: 2023-07-07 | End: 2023-07-10 | Stop reason: HOSPADM

## 2023-07-07 RX ORDER — ONDANSETRON 2 MG/ML
4 INJECTION INTRAMUSCULAR; INTRAVENOUS EVERY 6 HOURS PRN
Status: DISCONTINUED | OUTPATIENT
Start: 2023-07-07 | End: 2023-07-10 | Stop reason: HOSPADM

## 2023-07-07 RX ORDER — AMLODIPINE BESYLATE 5 MG/1
5 TABLET ORAL DAILY
Status: DISCONTINUED | OUTPATIENT
Start: 2023-07-08 | End: 2023-07-10 | Stop reason: HOSPADM

## 2023-07-07 RX ORDER — POLYETHYLENE GLYCOL 3350 17 G/17G
17 POWDER, FOR SOLUTION ORAL 2 TIMES DAILY
Status: DISCONTINUED | OUTPATIENT
Start: 2023-07-07 | End: 2023-07-10 | Stop reason: HOSPADM

## 2023-07-07 RX ORDER — NITROGLYCERIN 0.4 MG/1
0.4 TABLET SUBLINGUAL EVERY 5 MIN PRN
Status: DISCONTINUED | OUTPATIENT
Start: 2023-07-07 | End: 2023-07-10 | Stop reason: HOSPADM

## 2023-07-07 RX ORDER — ASPIRIN 81 MG/1
81 TABLET, CHEWABLE ORAL DAILY
Status: DISCONTINUED | OUTPATIENT
Start: 2023-07-08 | End: 2023-07-10 | Stop reason: HOSPADM

## 2023-07-07 RX ADMIN — SODIUM CHLORIDE, POTASSIUM CHLORIDE, SODIUM LACTATE AND CALCIUM CHLORIDE: 600; 310; 30; 20 INJECTION, SOLUTION INTRAVENOUS at 23:24

## 2023-07-07 RX ADMIN — ALUMINUM HYDROXIDE AND MAGNESIUM HYDROXIDE 30 ML: 200; 200 SUSPENSION ORAL at 19:21

## 2023-07-07 RX ADMIN — ASPIRIN 325 MG: 325 TABLET ORAL at 22:36

## 2023-07-07 ASSESSMENT — PAIN DESCRIPTION - LOCATION: LOCATION: CHEST

## 2023-07-07 ASSESSMENT — PAIN DESCRIPTION - DESCRIPTORS: DESCRIPTORS: BURNING

## 2023-07-07 ASSESSMENT — PAIN - FUNCTIONAL ASSESSMENT: PAIN_FUNCTIONAL_ASSESSMENT: 0-10

## 2023-07-07 ASSESSMENT — PAIN SCALES - GENERAL: PAINLEVEL_OUTOF10: 6

## 2023-07-08 ENCOUNTER — APPOINTMENT (OUTPATIENT)
Facility: HOSPITAL | Age: 63
DRG: 282 | End: 2023-07-08
Attending: STUDENT IN AN ORGANIZED HEALTH CARE EDUCATION/TRAINING PROGRAM

## 2023-07-08 LAB
ALBUMIN SERPL-MCNC: 3.6 G/DL (ref 3.5–5)
ALBUMIN/GLOB SERPL: 1 (ref 1.1–2.2)
ALP SERPL-CCNC: 113 U/L (ref 45–117)
ALT SERPL-CCNC: 32 U/L (ref 12–78)
ANION GAP SERPL CALC-SCNC: 5 MMOL/L (ref 5–15)
APTT PPP: 26.8 SEC (ref 22.1–31)
AST SERPL-CCNC: 23 U/L (ref 15–37)
BILIRUB SERPL-MCNC: 0.8 MG/DL (ref 0.2–1)
BUN SERPL-MCNC: 12 MG/DL (ref 6–20)
BUN/CREAT SERPL: 17 (ref 12–20)
CALCIUM SERPL-MCNC: 9.2 MG/DL (ref 8.5–10.1)
CHLORIDE SERPL-SCNC: 104 MMOL/L (ref 97–108)
CHOLEST SERPL-MCNC: 156 MG/DL
CO2 SERPL-SCNC: 27 MMOL/L (ref 21–32)
CREAT SERPL-MCNC: 0.71 MG/DL (ref 0.55–1.02)
ECHO AO ARCH DIAM: 1.9 CM
ECHO AO ASC DIAM: 3.5 CM
ECHO AO ASCENDING AORTA INDEX: 1.75 CM/M2
ECHO AR MAX VEL PISA: 3 M/S
ECHO AV AREA PEAK VELOCITY: 1.6 CM2
ECHO AV AREA/BSA PEAK VELOCITY: 0.8 CM2/M2
ECHO AV PEAK GRADIENT: 11 MMHG
ECHO AV PEAK VELOCITY: 1.6 M/S
ECHO AV REGURGITANT PHT: 474 MILLISECOND
ECHO AV VELOCITY RATIO: 0.56
ECHO BSA: 2.07 M2
ECHO LA DIAMETER INDEX: 1.4 CM/M2
ECHO LA DIAMETER: 2.8 CM
ECHO LA VOL 2C: 35 ML (ref 22–52)
ECHO LA VOL 2C: 39 ML (ref 22–52)
ECHO LA VOL 4C: 24 ML (ref 22–52)
ECHO LA VOL 4C: 29 ML (ref 22–52)
ECHO LA VOL BP: 31 ML (ref 22–52)
ECHO LA VOL/BSA BIPLANE: 16 ML/M2 (ref 16–34)
ECHO LA VOLUME AREA LENGTH: 36 ML
ECHO LA VOLUME INDEX AREA LENGTH: 18 ML/M2 (ref 16–34)
ECHO LV E' LATERAL VELOCITY: 8 CM/S
ECHO LV E' SEPTAL VELOCITY: 6 CM/S
ECHO LV EDV A2C: 81 ML
ECHO LV EDV A4C: 76 ML
ECHO LV EDV BP: 79 ML (ref 56–104)
ECHO LV EDV INDEX A4C: 38 ML/M2
ECHO LV EDV INDEX BP: 40 ML/M2
ECHO LV EDV NDEX A2C: 41 ML/M2
ECHO LV EJECTION FRACTION A2C: 46 %
ECHO LV EJECTION FRACTION A4C: 65 %
ECHO LV EJECTION FRACTION BIPLANE: 56 % (ref 55–100)
ECHO LV ESV A2C: 44 ML
ECHO LV ESV A4C: 26 ML
ECHO LV ESV BP: 35 ML (ref 19–49)
ECHO LV ESV INDEX A2C: 22 ML/M2
ECHO LV ESV INDEX A4C: 13 ML/M2
ECHO LV ESV INDEX BP: 18 ML/M2
ECHO LV FRACTIONAL SHORTENING: 32 % (ref 28–44)
ECHO LV INTERNAL DIMENSION DIASTOLE INDEX: 1.85 CM/M2
ECHO LV INTERNAL DIMENSION DIASTOLIC: 3.7 CM (ref 3.9–5.3)
ECHO LV INTERNAL DIMENSION SYSTOLIC INDEX: 1.25 CM/M2
ECHO LV INTERNAL DIMENSION SYSTOLIC: 2.5 CM
ECHO LV IVSD: 1.1 CM (ref 0.6–0.9)
ECHO LV MASS 2D: 120.8 G (ref 67–162)
ECHO LV MASS INDEX 2D: 60.4 G/M2 (ref 43–95)
ECHO LV POSTERIOR WALL DIASTOLIC: 1 CM (ref 0.6–0.9)
ECHO LV RELATIVE WALL THICKNESS RATIO: 0.54
ECHO LVOT AREA: 2.8 CM2
ECHO LVOT DIAM: 1.9 CM
ECHO LVOT MEAN GRADIENT: 2 MMHG
ECHO LVOT PEAK GRADIENT: 3 MMHG
ECHO LVOT PEAK VELOCITY: 0.9 M/S
ECHO LVOT STROKE VOLUME INDEX: 30.3 ML/M2
ECHO LVOT SV: 60.6 ML
ECHO LVOT VTI: 21.4 CM
ECHO MV A VELOCITY: 0.78 M/S
ECHO MV E DECELERATION TIME (DT): 249.9 MS
ECHO MV E VELOCITY: 0.76 M/S
ECHO MV E/A RATIO: 0.97
ECHO MV E/E' LATERAL: 9.5
ECHO MV E/E' RATIO (AVERAGED): 11.08
ECHO MV E/E' SEPTAL: 12.67
ECHO MV REGURGITANT PEAK GRADIENT: 81 MMHG
ECHO MV REGURGITANT PEAK VELOCITY: 4.5 M/S
ECHO PULMONARY ARTERY END DIASTOLIC PRESSURE: 3 MMHG
ECHO PV MAX VELOCITY: 0.9 M/S
ECHO PV PEAK GRADIENT: 4 MMHG
ECHO PV REGURGITANT MAX VELOCITY: 0.9 M/S
ECHO RV FREE WALL PEAK S': 13 CM/S
ECHO RV INTERNAL DIMENSION: 4.1 CM
ECHO RV TAPSE: 2.1 CM (ref 1.7–?)
ECHO TV REGURGITANT MAX VELOCITY: 2.26 M/S
ECHO TV REGURGITANT PEAK GRADIENT: 20 MMHG
ERYTHROCYTE [DISTWIDTH] IN BLOOD BY AUTOMATED COUNT: 12.6 % (ref 11.5–14.5)
ERYTHROCYTE [DISTWIDTH] IN BLOOD BY AUTOMATED COUNT: 12.7 % (ref 11.5–14.5)
EST. AVERAGE GLUCOSE BLD GHB EST-MCNC: 260 MG/DL
GLOBULIN SER CALC-MCNC: 3.7 G/DL (ref 2–4)
GLUCOSE BLD STRIP.AUTO-MCNC: 209 MG/DL (ref 65–117)
GLUCOSE BLD STRIP.AUTO-MCNC: 239 MG/DL (ref 65–117)
GLUCOSE BLD STRIP.AUTO-MCNC: 252 MG/DL (ref 65–117)
GLUCOSE BLD STRIP.AUTO-MCNC: 277 MG/DL (ref 65–117)
GLUCOSE SERPL-MCNC: 266 MG/DL (ref 65–100)
HBA1C MFR BLD: 10.7 % (ref 4–5.6)
HCT VFR BLD AUTO: 39 % (ref 35–47)
HCT VFR BLD AUTO: 39.9 % (ref 35–47)
HDLC SERPL-MCNC: 44 MG/DL
HDLC SERPL: 3.5 (ref 0–5)
HGB BLD-MCNC: 12.7 G/DL (ref 11.5–16)
HGB BLD-MCNC: 13 G/DL (ref 11.5–16)
INR PPP: 1 (ref 0.9–1.1)
LDLC SERPL CALC-MCNC: 84 MG/DL (ref 0–100)
MCH RBC QN AUTO: 26.2 PG (ref 26–34)
MCH RBC QN AUTO: 26.3 PG (ref 26–34)
MCHC RBC AUTO-ENTMCNC: 32.6 G/DL (ref 30–36.5)
MCHC RBC AUTO-ENTMCNC: 32.6 G/DL (ref 30–36.5)
MCV RBC AUTO: 80.4 FL (ref 80–99)
MCV RBC AUTO: 80.6 FL (ref 80–99)
NRBC # BLD: 0 K/UL (ref 0–0.01)
NRBC # BLD: 0 K/UL (ref 0–0.01)
NRBC BLD-RTO: 0 PER 100 WBC
NRBC BLD-RTO: 0 PER 100 WBC
PLATELET # BLD AUTO: 295 K/UL (ref 150–400)
PLATELET # BLD AUTO: 328 K/UL (ref 150–400)
PMV BLD AUTO: 9.1 FL (ref 8.9–12.9)
PMV BLD AUTO: 9.5 FL (ref 8.9–12.9)
POTASSIUM SERPL-SCNC: 3.9 MMOL/L (ref 3.5–5.1)
PROT SERPL-MCNC: 7.3 G/DL (ref 6.4–8.2)
PROTHROMBIN TIME: 10.8 SEC (ref 9–11.1)
RBC # BLD AUTO: 4.85 M/UL (ref 3.8–5.2)
RBC # BLD AUTO: 4.95 M/UL (ref 3.8–5.2)
SERVICE CMNT-IMP: ABNORMAL
SODIUM SERPL-SCNC: 136 MMOL/L (ref 136–145)
T4 FREE SERPL-MCNC: 0.8 NG/DL (ref 0.8–1.5)
THERAPEUTIC RANGE: NORMAL SECS (ref 58–77)
TRIGL SERPL-MCNC: 140 MG/DL
TROPONIN I SERPL HS-MCNC: 112 NG/L (ref 0–51)
TROPONIN I SERPL HS-MCNC: 266 NG/L (ref 0–51)
TROPONIN I SERPL HS-MCNC: 496 NG/L (ref 0–51)
TROPONIN I SERPL HS-MCNC: 672 NG/L (ref 0–51)
TROPONIN I SERPL HS-MCNC: 720 NG/L (ref 0–51)
TROPONIN I SERPL HS-MCNC: 789 NG/L (ref 0–51)
TROPONIN I SERPL HS-MCNC: 956 NG/L (ref 0–51)
UFH PPP CHRO-ACNC: 0.51 IU/ML
UFH PPP CHRO-ACNC: 0.65 IU/ML
UFH PPP CHRO-ACNC: <0.1 IU/ML
VLDLC SERPL CALC-MCNC: 28 MG/DL
WBC # BLD AUTO: 6 K/UL (ref 3.6–11)
WBC # BLD AUTO: 7.2 K/UL (ref 3.6–11)

## 2023-07-08 PROCEDURE — 6360000002 HC RX W HCPCS: Performed by: STUDENT IN AN ORGANIZED HEALTH CARE EDUCATION/TRAINING PROGRAM

## 2023-07-08 PROCEDURE — 85027 COMPLETE CBC AUTOMATED: CPT

## 2023-07-08 PROCEDURE — 6370000000 HC RX 637 (ALT 250 FOR IP): Performed by: INTERNAL MEDICINE

## 2023-07-08 PROCEDURE — 82962 GLUCOSE BLOOD TEST: CPT

## 2023-07-08 PROCEDURE — 93005 ELECTROCARDIOGRAM TRACING: CPT | Performed by: EMERGENCY MEDICINE

## 2023-07-08 PROCEDURE — 84484 ASSAY OF TROPONIN QUANT: CPT

## 2023-07-08 PROCEDURE — 36415 COLL VENOUS BLD VENIPUNCTURE: CPT

## 2023-07-08 PROCEDURE — 1100000003 HC PRIVATE W/ TELEMETRY

## 2023-07-08 PROCEDURE — A4216 STERILE WATER/SALINE, 10 ML: HCPCS | Performed by: STUDENT IN AN ORGANIZED HEALTH CARE EDUCATION/TRAINING PROGRAM

## 2023-07-08 PROCEDURE — 99222 1ST HOSP IP/OBS MODERATE 55: CPT | Performed by: INTERNAL MEDICINE

## 2023-07-08 PROCEDURE — 85610 PROTHROMBIN TIME: CPT

## 2023-07-08 PROCEDURE — 6370000000 HC RX 637 (ALT 250 FOR IP): Performed by: STUDENT IN AN ORGANIZED HEALTH CARE EDUCATION/TRAINING PROGRAM

## 2023-07-08 PROCEDURE — 6360000002 HC RX W HCPCS

## 2023-07-08 PROCEDURE — 85730 THROMBOPLASTIN TIME PARTIAL: CPT

## 2023-07-08 PROCEDURE — 80053 COMPREHEN METABOLIC PANEL: CPT

## 2023-07-08 PROCEDURE — 93306 TTE W/DOPPLER COMPLETE: CPT

## 2023-07-08 PROCEDURE — C9113 INJ PANTOPRAZOLE SODIUM, VIA: HCPCS | Performed by: STUDENT IN AN ORGANIZED HEALTH CARE EDUCATION/TRAINING PROGRAM

## 2023-07-08 PROCEDURE — 2580000003 HC RX 258: Performed by: STUDENT IN AN ORGANIZED HEALTH CARE EDUCATION/TRAINING PROGRAM

## 2023-07-08 PROCEDURE — 85520 HEPARIN ASSAY: CPT

## 2023-07-08 PROCEDURE — 6370000000 HC RX 637 (ALT 250 FOR IP): Performed by: HOSPITALIST

## 2023-07-08 RX ORDER — HEPARIN SODIUM 1000 [USP'U]/ML
30 INJECTION, SOLUTION INTRAVENOUS; SUBCUTANEOUS PRN
Status: DISCONTINUED | OUTPATIENT
Start: 2023-07-08 | End: 2023-07-08

## 2023-07-08 RX ORDER — INSULIN GLARGINE 100 [IU]/ML
10 INJECTION, SOLUTION SUBCUTANEOUS NIGHTLY
Status: DISCONTINUED | OUTPATIENT
Start: 2023-07-08 | End: 2023-07-09

## 2023-07-08 RX ORDER — HEPARIN SODIUM 10000 [USP'U]/100ML
5-30 INJECTION, SOLUTION INTRAVENOUS CONTINUOUS
Status: DISCONTINUED | OUTPATIENT
Start: 2023-07-08 | End: 2023-07-10

## 2023-07-08 RX ORDER — HEPARIN SODIUM 1000 [USP'U]/ML
4000 INJECTION, SOLUTION INTRAVENOUS; SUBCUTANEOUS PRN
Status: DISCONTINUED | OUTPATIENT
Start: 2023-07-08 | End: 2023-07-10 | Stop reason: HOSPADM

## 2023-07-08 RX ORDER — HEPARIN SODIUM 1000 [USP'U]/ML
2000 INJECTION, SOLUTION INTRAVENOUS; SUBCUTANEOUS PRN
Status: DISCONTINUED | OUTPATIENT
Start: 2023-07-08 | End: 2023-07-10 | Stop reason: HOSPADM

## 2023-07-08 RX ORDER — HEPARIN SODIUM 1000 [USP'U]/ML
60 INJECTION, SOLUTION INTRAVENOUS; SUBCUTANEOUS ONCE
Status: COMPLETED | OUTPATIENT
Start: 2023-07-08 | End: 2023-07-08

## 2023-07-08 RX ORDER — ATORVASTATIN CALCIUM 20 MG/1
80 TABLET, FILM COATED ORAL NIGHTLY
Status: DISCONTINUED | OUTPATIENT
Start: 2023-07-08 | End: 2023-07-10 | Stop reason: HOSPADM

## 2023-07-08 RX ORDER — HEPARIN SODIUM 1000 [USP'U]/ML
60 INJECTION, SOLUTION INTRAVENOUS; SUBCUTANEOUS PRN
Status: DISCONTINUED | OUTPATIENT
Start: 2023-07-08 | End: 2023-07-08

## 2023-07-08 RX ORDER — PANTOPRAZOLE SODIUM 40 MG/1
40 TABLET, DELAYED RELEASE ORAL
Status: DISCONTINUED | OUTPATIENT
Start: 2023-07-09 | End: 2023-07-10 | Stop reason: HOSPADM

## 2023-07-08 RX ADMIN — POLYETHYLENE GLYCOL 3350 17 G: 17 POWDER, FOR SOLUTION ORAL at 09:51

## 2023-07-08 RX ADMIN — ASPIRIN 81 MG: 81 TABLET, CHEWABLE ORAL at 09:50

## 2023-07-08 RX ADMIN — HEPARIN SODIUM 10 UNITS/KG/HR: 10000 INJECTION, SOLUTION INTRAVENOUS at 11:21

## 2023-07-08 RX ADMIN — LISINOPRIL 20 MG: 20 TABLET ORAL at 09:50

## 2023-07-08 RX ADMIN — INSULIN LISPRO 4 UNITS: 100 INJECTION, SOLUTION INTRAVENOUS; SUBCUTANEOUS at 13:16

## 2023-07-08 RX ADMIN — POLYETHYLENE GLYCOL 3350 17 G: 17 POWDER, FOR SOLUTION ORAL at 21:45

## 2023-07-08 RX ADMIN — AMLODIPINE BESYLATE 5 MG: 5 TABLET ORAL at 09:50

## 2023-07-08 RX ADMIN — HEPARIN SODIUM 5720 UNITS: 1000 INJECTION INTRAVENOUS; SUBCUTANEOUS at 11:21

## 2023-07-08 RX ADMIN — INSULIN LISPRO 2 UNITS: 100 INJECTION, SOLUTION INTRAVENOUS; SUBCUTANEOUS at 09:51

## 2023-07-08 RX ADMIN — METOPROLOL TARTRATE 25 MG: 25 TABLET, FILM COATED ORAL at 09:50

## 2023-07-08 RX ADMIN — SODIUM CHLORIDE, POTASSIUM CHLORIDE, SODIUM LACTATE AND CALCIUM CHLORIDE: 600; 310; 30; 20 INJECTION, SOLUTION INTRAVENOUS at 00:18

## 2023-07-08 RX ADMIN — ATORVASTATIN CALCIUM 80 MG: 20 TABLET, FILM COATED ORAL at 21:44

## 2023-07-08 RX ADMIN — INSULIN LISPRO 4 UNITS: 100 INJECTION, SOLUTION INTRAVENOUS; SUBCUTANEOUS at 17:52

## 2023-07-08 RX ADMIN — INSULIN GLARGINE 10 UNITS: 100 INJECTION, SOLUTION SUBCUTANEOUS at 21:44

## 2023-07-08 RX ADMIN — SODIUM CHLORIDE 40 MG: 9 INJECTION INTRAMUSCULAR; INTRAVENOUS; SUBCUTANEOUS at 09:50

## 2023-07-08 RX ADMIN — METOPROLOL TARTRATE 25 MG: 25 TABLET, FILM COATED ORAL at 21:44

## 2023-07-08 ASSESSMENT — PAIN SCALES - GENERAL
PAINLEVEL_OUTOF10: 0

## 2023-07-09 LAB
ALBUMIN SERPL-MCNC: 3.3 G/DL (ref 3.5–5)
ALBUMIN/GLOB SERPL: 1.1 (ref 1.1–2.2)
ALP SERPL-CCNC: 104 U/L (ref 45–117)
ALT SERPL-CCNC: 38 U/L (ref 12–78)
ANION GAP SERPL CALC-SCNC: 4 MMOL/L (ref 5–15)
AST SERPL-CCNC: 32 U/L (ref 15–37)
BASOPHILS # BLD: 0.1 K/UL (ref 0–0.1)
BASOPHILS NFR BLD: 1 % (ref 0–1)
BILIRUB SERPL-MCNC: 0.4 MG/DL (ref 0.2–1)
BUN SERPL-MCNC: 11 MG/DL (ref 6–20)
BUN/CREAT SERPL: 15 (ref 12–20)
CALCIUM SERPL-MCNC: 8.8 MG/DL (ref 8.5–10.1)
CHLORIDE SERPL-SCNC: 106 MMOL/L (ref 97–108)
CO2 SERPL-SCNC: 28 MMOL/L (ref 21–32)
CREAT SERPL-MCNC: 0.72 MG/DL (ref 0.55–1.02)
DIFFERENTIAL METHOD BLD: ABNORMAL
EOSINOPHIL # BLD: 0.1 K/UL (ref 0–0.4)
EOSINOPHIL NFR BLD: 2 % (ref 0–7)
ERYTHROCYTE [DISTWIDTH] IN BLOOD BY AUTOMATED COUNT: 12.5 % (ref 11.5–14.5)
GLOBULIN SER CALC-MCNC: 2.9 G/DL (ref 2–4)
GLUCOSE BLD STRIP.AUTO-MCNC: 225 MG/DL (ref 65–117)
GLUCOSE BLD STRIP.AUTO-MCNC: 254 MG/DL (ref 65–117)
GLUCOSE BLD STRIP.AUTO-MCNC: 258 MG/DL (ref 65–117)
GLUCOSE BLD STRIP.AUTO-MCNC: 280 MG/DL (ref 65–117)
GLUCOSE SERPL-MCNC: 261 MG/DL (ref 65–100)
HCT VFR BLD AUTO: 38.4 % (ref 35–47)
HGB BLD-MCNC: 12.1 G/DL (ref 11.5–16)
IMM GRANULOCYTES # BLD AUTO: 0 K/UL (ref 0–0.04)
IMM GRANULOCYTES NFR BLD AUTO: 1 % (ref 0–0.5)
LYMPHOCYTES # BLD: 3.7 K/UL (ref 0.8–3.5)
LYMPHOCYTES NFR BLD: 49 % (ref 12–49)
MCH RBC QN AUTO: 25.6 PG (ref 26–34)
MCHC RBC AUTO-ENTMCNC: 31.5 G/DL (ref 30–36.5)
MCV RBC AUTO: 81.4 FL (ref 80–99)
MONOCYTES # BLD: 0.4 K/UL (ref 0–1)
MONOCYTES NFR BLD: 6 % (ref 5–13)
NEUTS SEG # BLD: 3 K/UL (ref 1.8–8)
NEUTS SEG NFR BLD: 41 % (ref 32–75)
NRBC # BLD: 0 K/UL (ref 0–0.01)
NRBC BLD-RTO: 0 PER 100 WBC
PLATELET # BLD AUTO: 282 K/UL (ref 150–400)
PMV BLD AUTO: 9.5 FL (ref 8.9–12.9)
POTASSIUM SERPL-SCNC: 3.7 MMOL/L (ref 3.5–5.1)
PROT SERPL-MCNC: 6.2 G/DL (ref 6.4–8.2)
RBC # BLD AUTO: 4.72 M/UL (ref 3.8–5.2)
SERVICE CMNT-IMP: ABNORMAL
SODIUM SERPL-SCNC: 138 MMOL/L (ref 136–145)
TROPONIN I SERPL HS-MCNC: 277 NG/L (ref 0–51)
UFH PPP CHRO-ACNC: 0.69 IU/ML
UFH PPP CHRO-ACNC: 0.77 IU/ML
UFH PPP CHRO-ACNC: 0.78 IU/ML
WBC # BLD AUTO: 7.3 K/UL (ref 3.6–11)

## 2023-07-09 PROCEDURE — 6370000000 HC RX 637 (ALT 250 FOR IP): Performed by: HOSPITALIST

## 2023-07-09 PROCEDURE — 6370000000 HC RX 637 (ALT 250 FOR IP): Performed by: INTERNAL MEDICINE

## 2023-07-09 PROCEDURE — 6360000002 HC RX W HCPCS

## 2023-07-09 PROCEDURE — 84484 ASSAY OF TROPONIN QUANT: CPT

## 2023-07-09 PROCEDURE — 1100000003 HC PRIVATE W/ TELEMETRY

## 2023-07-09 PROCEDURE — 6370000000 HC RX 637 (ALT 250 FOR IP): Performed by: STUDENT IN AN ORGANIZED HEALTH CARE EDUCATION/TRAINING PROGRAM

## 2023-07-09 PROCEDURE — 80053 COMPREHEN METABOLIC PANEL: CPT

## 2023-07-09 PROCEDURE — 94761 N-INVAS EAR/PLS OXIMETRY MLT: CPT

## 2023-07-09 PROCEDURE — 82962 GLUCOSE BLOOD TEST: CPT

## 2023-07-09 PROCEDURE — 85520 HEPARIN ASSAY: CPT

## 2023-07-09 PROCEDURE — 85025 COMPLETE CBC W/AUTO DIFF WBC: CPT

## 2023-07-09 PROCEDURE — 36415 COLL VENOUS BLD VENIPUNCTURE: CPT

## 2023-07-09 PROCEDURE — 99232 SBSQ HOSP IP/OBS MODERATE 35: CPT | Performed by: INTERNAL MEDICINE

## 2023-07-09 PROCEDURE — 2580000003 HC RX 258: Performed by: HOSPITALIST

## 2023-07-09 RX ORDER — INSULIN GLARGINE 100 [IU]/ML
14 INJECTION, SOLUTION SUBCUTANEOUS NIGHTLY
Status: DISCONTINUED | OUTPATIENT
Start: 2023-07-09 | End: 2023-07-10 | Stop reason: HOSPADM

## 2023-07-09 RX ADMIN — ASPIRIN 81 MG: 81 TABLET, CHEWABLE ORAL at 08:42

## 2023-07-09 RX ADMIN — POLYETHYLENE GLYCOL 3350 17 G: 17 POWDER, FOR SOLUTION ORAL at 21:07

## 2023-07-09 RX ADMIN — AMLODIPINE BESYLATE 5 MG: 5 TABLET ORAL at 08:42

## 2023-07-09 RX ADMIN — ATORVASTATIN CALCIUM 80 MG: 20 TABLET, FILM COATED ORAL at 21:07

## 2023-07-09 RX ADMIN — METOPROLOL TARTRATE 25 MG: 25 TABLET, FILM COATED ORAL at 08:42

## 2023-07-09 RX ADMIN — INSULIN LISPRO 4 UNITS: 100 INJECTION, SOLUTION INTRAVENOUS; SUBCUTANEOUS at 12:30

## 2023-07-09 RX ADMIN — LISINOPRIL 20 MG: 20 TABLET ORAL at 08:42

## 2023-07-09 RX ADMIN — INSULIN GLARGINE 14 UNITS: 100 INJECTION, SOLUTION SUBCUTANEOUS at 21:07

## 2023-07-09 RX ADMIN — SODIUM CHLORIDE, POTASSIUM CHLORIDE, SODIUM LACTATE AND CALCIUM CHLORIDE: 600; 310; 30; 20 INJECTION, SOLUTION INTRAVENOUS at 04:19

## 2023-07-09 RX ADMIN — METOPROLOL TARTRATE 25 MG: 25 TABLET, FILM COATED ORAL at 21:07

## 2023-07-09 RX ADMIN — INSULIN LISPRO 4 UNITS: 100 INJECTION, SOLUTION INTRAVENOUS; SUBCUTANEOUS at 17:23

## 2023-07-09 RX ADMIN — HEPARIN SODIUM 9 UNITS/KG/HR: 10000 INJECTION, SOLUTION INTRAVENOUS at 15:02

## 2023-07-09 RX ADMIN — INSULIN LISPRO 2 UNITS: 100 INJECTION, SOLUTION INTRAVENOUS; SUBCUTANEOUS at 08:47

## 2023-07-09 RX ADMIN — PANTOPRAZOLE SODIUM 40 MG: 40 TABLET, DELAYED RELEASE ORAL at 06:56

## 2023-07-10 VITALS
BODY MASS INDEX: 34.91 KG/M2 | DIASTOLIC BLOOD PRESSURE: 73 MMHG | OXYGEN SATURATION: 99 % | SYSTOLIC BLOOD PRESSURE: 133 MMHG | HEART RATE: 60 BPM | WEIGHT: 204.5 LBS | RESPIRATION RATE: 16 BRPM | HEIGHT: 64 IN | TEMPERATURE: 97.3 F

## 2023-07-10 PROBLEM — I21.4 NSTEMI (NON-ST ELEVATED MYOCARDIAL INFARCTION) (HCC): Status: ACTIVE | Noted: 2023-07-10

## 2023-07-10 LAB
ANION GAP SERPL CALC-SCNC: 4 MMOL/L (ref 5–15)
BUN SERPL-MCNC: 9 MG/DL (ref 6–20)
BUN/CREAT SERPL: 12 (ref 12–20)
CALCIUM SERPL-MCNC: 9.2 MG/DL (ref 8.5–10.1)
CHLORIDE SERPL-SCNC: 108 MMOL/L (ref 97–108)
CO2 SERPL-SCNC: 26 MMOL/L (ref 21–32)
CREAT SERPL-MCNC: 0.73 MG/DL (ref 0.55–1.02)
ECHO BSA: 2.07 M2
EKG ATRIAL RATE: 62 BPM
EKG ATRIAL RATE: 74 BPM
EKG DIAGNOSIS: NORMAL
EKG DIAGNOSIS: NORMAL
EKG P AXIS: 43 DEGREES
EKG P AXIS: 46 DEGREES
EKG P-R INTERVAL: 162 MS
EKG P-R INTERVAL: 162 MS
EKG Q-T INTERVAL: 388 MS
EKG Q-T INTERVAL: 432 MS
EKG QRS DURATION: 74 MS
EKG QRS DURATION: 74 MS
EKG QTC CALCULATION (BAZETT): 430 MS
EKG QTC CALCULATION (BAZETT): 438 MS
EKG R AXIS: 13 DEGREES
EKG R AXIS: 16 DEGREES
EKG T AXIS: 18 DEGREES
EKG T AXIS: 20 DEGREES
EKG VENTRICULAR RATE: 62 BPM
EKG VENTRICULAR RATE: 74 BPM
ERYTHROCYTE [DISTWIDTH] IN BLOOD BY AUTOMATED COUNT: 12.8 % (ref 11.5–14.5)
EST. AVERAGE GLUCOSE BLD GHB EST-MCNC: 263 MG/DL
GLUCOSE BLD STRIP.AUTO-MCNC: 250 MG/DL (ref 65–117)
GLUCOSE SERPL-MCNC: 255 MG/DL (ref 65–100)
HBA1C MFR BLD: 10.8 % (ref 4–5.6)
HCT VFR BLD AUTO: 41.8 % (ref 35–47)
HGB BLD-MCNC: 13.5 G/DL (ref 11.5–16)
MAGNESIUM SERPL-MCNC: 1.7 MG/DL (ref 1.6–2.4)
MCH RBC QN AUTO: 25.9 PG (ref 26–34)
MCHC RBC AUTO-ENTMCNC: 32.3 G/DL (ref 30–36.5)
MCV RBC AUTO: 80.2 FL (ref 80–99)
NRBC # BLD: 0 K/UL (ref 0–0.01)
NRBC BLD-RTO: 0 PER 100 WBC
PHOSPHATE SERPL-MCNC: 4 MG/DL (ref 2.6–4.7)
PLATELET # BLD AUTO: 329 K/UL (ref 150–400)
PMV BLD AUTO: 9.4 FL (ref 8.9–12.9)
POTASSIUM SERPL-SCNC: 3.8 MMOL/L (ref 3.5–5.1)
RBC # BLD AUTO: 5.21 M/UL (ref 3.8–5.2)
SERVICE CMNT-IMP: ABNORMAL
SODIUM SERPL-SCNC: 138 MMOL/L (ref 136–145)
TROPONIN I SERPL HS-MCNC: 131 NG/L (ref 0–51)
UFH PPP CHRO-ACNC: 0.91 IU/ML
WBC # BLD AUTO: 6.5 K/UL (ref 3.6–11)

## 2023-07-10 PROCEDURE — 6360000004 HC RX CONTRAST MEDICATION: Performed by: INTERNAL MEDICINE

## 2023-07-10 PROCEDURE — 85520 HEPARIN ASSAY: CPT

## 2023-07-10 PROCEDURE — 6360000002 HC RX W HCPCS: Performed by: INTERNAL MEDICINE

## 2023-07-10 PROCEDURE — B2151ZZ FLUOROSCOPY OF LEFT HEART USING LOW OSMOLAR CONTRAST: ICD-10-PCS | Performed by: INTERNAL MEDICINE

## 2023-07-10 PROCEDURE — 84100 ASSAY OF PHOSPHORUS: CPT

## 2023-07-10 PROCEDURE — 85027 COMPLETE CBC AUTOMATED: CPT

## 2023-07-10 PROCEDURE — 80048 BASIC METABOLIC PNL TOTAL CA: CPT

## 2023-07-10 PROCEDURE — C1894 INTRO/SHEATH, NON-LASER: HCPCS | Performed by: INTERNAL MEDICINE

## 2023-07-10 PROCEDURE — 83735 ASSAY OF MAGNESIUM: CPT

## 2023-07-10 PROCEDURE — 6360000002 HC RX W HCPCS

## 2023-07-10 PROCEDURE — 36415 COLL VENOUS BLD VENIPUNCTURE: CPT

## 2023-07-10 PROCEDURE — 82962 GLUCOSE BLOOD TEST: CPT

## 2023-07-10 PROCEDURE — 2500000003 HC RX 250 WO HCPCS: Performed by: INTERNAL MEDICINE

## 2023-07-10 PROCEDURE — 93458 L HRT ARTERY/VENTRICLE ANGIO: CPT | Performed by: INTERNAL MEDICINE

## 2023-07-10 PROCEDURE — 83036 HEMOGLOBIN GLYCOSYLATED A1C: CPT

## 2023-07-10 PROCEDURE — 84484 ASSAY OF TROPONIN QUANT: CPT

## 2023-07-10 PROCEDURE — 99152 MOD SED SAME PHYS/QHP 5/>YRS: CPT | Performed by: INTERNAL MEDICINE

## 2023-07-10 PROCEDURE — 2709999900 HC NON-CHARGEABLE SUPPLY: Performed by: INTERNAL MEDICINE

## 2023-07-10 PROCEDURE — 6370000000 HC RX 637 (ALT 250 FOR IP): Performed by: STUDENT IN AN ORGANIZED HEALTH CARE EDUCATION/TRAINING PROGRAM

## 2023-07-10 PROCEDURE — 94761 N-INVAS EAR/PLS OXIMETRY MLT: CPT

## 2023-07-10 PROCEDURE — 6370000000 HC RX 637 (ALT 250 FOR IP): Performed by: HOSPITALIST

## 2023-07-10 PROCEDURE — 99233 SBSQ HOSP IP/OBS HIGH 50: CPT | Performed by: INTERNAL MEDICINE

## 2023-07-10 PROCEDURE — 4A023N7 MEASUREMENT OF CARDIAC SAMPLING AND PRESSURE, LEFT HEART, PERCUTANEOUS APPROACH: ICD-10-PCS | Performed by: INTERNAL MEDICINE

## 2023-07-10 PROCEDURE — C1769 GUIDE WIRE: HCPCS | Performed by: INTERNAL MEDICINE

## 2023-07-10 PROCEDURE — B2111ZZ FLUOROSCOPY OF MULTIPLE CORONARY ARTERIES USING LOW OSMOLAR CONTRAST: ICD-10-PCS | Performed by: INTERNAL MEDICINE

## 2023-07-10 RX ORDER — LIDOCAINE HYDROCHLORIDE 10 MG/ML
INJECTION, SOLUTION INFILTRATION; PERINEURAL PRN
Status: DISCONTINUED | OUTPATIENT
Start: 2023-07-10 | End: 2023-07-10 | Stop reason: HOSPADM

## 2023-07-10 RX ORDER — SODIUM CHLORIDE 9 MG/ML
INJECTION, SOLUTION INTRAVENOUS CONTINUOUS
OUTPATIENT
Start: 2023-07-10 | End: 2023-07-10

## 2023-07-10 RX ORDER — FENTANYL CITRATE 50 UG/ML
INJECTION, SOLUTION INTRAMUSCULAR; INTRAVENOUS PRN
Status: DISCONTINUED | OUTPATIENT
Start: 2023-07-10 | End: 2023-07-10 | Stop reason: HOSPADM

## 2023-07-10 RX ORDER — HEPARIN SODIUM 200 [USP'U]/100ML
INJECTION, SOLUTION INTRAVENOUS PRN
Status: DISCONTINUED | OUTPATIENT
Start: 2023-07-10 | End: 2023-07-10 | Stop reason: HOSPADM

## 2023-07-10 RX ORDER — HEPARIN SODIUM 1000 [USP'U]/ML
INJECTION, SOLUTION INTRAVENOUS; SUBCUTANEOUS PRN
Status: DISCONTINUED | OUTPATIENT
Start: 2023-07-10 | End: 2023-07-10 | Stop reason: HOSPADM

## 2023-07-10 RX ORDER — VERAPAMIL HYDROCHLORIDE 2.5 MG/ML
INJECTION, SOLUTION INTRAVENOUS PRN
Status: DISCONTINUED | OUTPATIENT
Start: 2023-07-10 | End: 2023-07-10 | Stop reason: HOSPADM

## 2023-07-10 RX ORDER — MIDAZOLAM HYDROCHLORIDE 1 MG/ML
INJECTION INTRAMUSCULAR; INTRAVENOUS PRN
Status: DISCONTINUED | OUTPATIENT
Start: 2023-07-10 | End: 2023-07-10 | Stop reason: HOSPADM

## 2023-07-10 RX ORDER — ATORVASTATIN CALCIUM 80 MG/1
80 TABLET, FILM COATED ORAL NIGHTLY
Qty: 30 TABLET | Refills: 1 | Status: SHIPPED | OUTPATIENT
Start: 2023-07-10

## 2023-07-10 RX ORDER — ASPIRIN 81 MG/1
81 TABLET, CHEWABLE ORAL DAILY
Qty: 30 TABLET | Refills: 1 | Status: SHIPPED | OUTPATIENT
Start: 2023-07-11

## 2023-07-10 RX ADMIN — INSULIN LISPRO 4 UNITS: 100 INJECTION, SOLUTION INTRAVENOUS; SUBCUTANEOUS at 13:14

## 2023-07-10 RX ADMIN — AMLODIPINE BESYLATE 5 MG: 5 TABLET ORAL at 11:00

## 2023-07-10 RX ADMIN — HEPARIN SODIUM 8 UNITS/KG/HR: 10000 INJECTION, SOLUTION INTRAVENOUS at 07:26

## 2023-07-10 RX ADMIN — PANTOPRAZOLE SODIUM 40 MG: 40 TABLET, DELAYED RELEASE ORAL at 06:31

## 2023-07-10 RX ADMIN — LISINOPRIL 20 MG: 20 TABLET ORAL at 11:00

## 2023-07-10 RX ADMIN — POLYETHYLENE GLYCOL 3350 17 G: 17 POWDER, FOR SOLUTION ORAL at 11:00

## 2023-07-10 RX ADMIN — ASPIRIN 81 MG: 81 TABLET, CHEWABLE ORAL at 11:00

## 2023-07-10 NOTE — PROGRESS NOTES
9:06 AM  TRANSFER - IN REPORT:    Verbal report received from [place] on [address]  being received from cath lab for routine post-op      Report consisted of patient's Situation, Background, Assessment and   Recommendations(SBAR). Information from the following report(s) Nurse Handoff Report was reviewed with the receiving nurse. Opportunity for questions and clarification was provided. Assessment completed upon patient's arrival to unit and care assumed. 10:00 AM  2 ml air released from TR Band. No bleeding or hematoma noted. Radial and Ulnar pulse on right wrist palpable. Pt tolerated well. Will continue to monitor. 10:05 AM  3 ml air released from TR Band. No bleeding or hematoma noted. Radial and Ulnar pulse on right wrist palpable. Pt tolerated well. Will continue to monitor. 10:10 AM  4 ml air released from TR Band. No bleeding or hematoma noted. Radial and Ulnar pulse on right wrist palpable. Pt tolerated well. Will continue to monitor. 10:15 AM  5 ml air released from TR Band. No bleeding or hematoma noted. Radial and Ulnar pulse on right wrist palpable. Pt tolerated well. Will continue to monitor. 10:15 AM  Air release completed. TR Band removed from right wrist. No bleeding or  Hematoma. Dressing applied. Wrist immobilizer in place. Radial and ulnar pulse remain palpable on affected extremity. Pt tolerated well. Instructions given to pt regarding movement and activity restrictions. Pt voiced understanding. 10:20 AM  TRANSFER - OUT REPORT:    Verbal report given to [facility] on [address]  being transferred to [facility] for routine post-op       Report consisted of patient's Situation, Background, Assessment and   Recommendations(SBAR). Information from the following report(s) Nurse Handoff Report was reviewed with the receiving nurse.            Lines:   Peripheral IV 07/07/23 Left;Posterior Hand (Active)   Site Assessment Clean, dry & intact 07/10/23 0000   Line Status

## 2023-07-10 NOTE — CARE COORDINATION
07/10/23 0548   Service Assessment   Patient Orientation Alert and Oriented;Person;Place;Situation;Self   Cognition Alert   History Provided By Patient   Primary Caregiver Self   Support Systems Children;Family Members;Zoroastrianism/Rhianna [address] is:   (patient has an AD not on file here)   PCP Verified by CM Yes   Last Visit to PCP Within last 3 months   Prior Functional Level Independent in ADLs/IADLs   Current Functional Level Independent in ADLs/IADLs   Can patient return to prior living arrangement Yes   Family able to assist with home care needs: Yes   Would you like for me to discuss the discharge plan with any other family members/significant others, and if so, who? No   Financial Resources   (Patient has no insurance and will apply for Medicaid and the [employer].)   Social/Functional History   Lives With Alone   Type of [facility] [address] One level   [address] to enter with rails   Entrance Stairs - Number of Steps 5   Entrance Stairs - [address] [facility] Yes   Discharge Planning   Type of Residence House   Patient expects to be discharged to: House     Medications are from [address] on [address]. Family to transport pt home at d/c  Pt to follow up OP with providers as scheduled.

## 2023-07-10 NOTE — CARDIO/PULMONARY
[facility] Cardiac Rehab:    Camilo Russ -received consult for cardiac education post cath. Pt dx NSTEMI was seen at bedside in [facility]. Cath revealed Branch vessel /CAD with plans form medical management. Explained cardiac rehab and the benefits. Pt states she is unsure about participation at this time and will talk to her cardiologist and make a decision. Pt lives closer to [address] [facility] but she states she likes [facility]. Written literature provided and phone numbers provided.        Camilo Russ verbalized understanding and all questions were answered

## 2023-07-10 NOTE — PROGRESS NOTES
0700 Bedside and Verbal shift change report given to Sarah Ireland RN (oncoming nurse) by Morales White RN (offgoing nurse). Report included the following information Nurse Handoff Report, Index, Intake/Output, MAR, Recent Results, and Cardiac Rhythm NSR .     0700: Heparin gtt verified with night shift RN, Morales White. Per RN, schedule of anti-xa was not followed and heparin gtt is not at the right rate. Per RN, pharmacy aware. Due to patient being scheduled for Cardiac Cath at 0800, heparin gtt stopped. 0800 lab called to state that anti xa lab drawn by night shift RN was not filled to top and needs to be redrawn. Pt already off of floor, will redraw when she returns to floor if provider wants it. SHE pt before she went off floor. 21  discharge paperwork reviewed with pt and pt understands with no further questions. Pt discharged.

## 2023-07-11 ENCOUNTER — TELEPHONE (OUTPATIENT)
Age: 63
End: 2023-07-11

## 2023-07-11 ENCOUNTER — TRANSCRIBE ORDERS (OUTPATIENT)
Facility: HOSPITAL | Age: 63
End: 2023-07-11

## 2023-07-11 DIAGNOSIS — I21.4 ACUTE MYOCARDIAL INFARCTION, SUBENDOCARDIAL INFARCTION, INITIAL EPISODE OF CARE (HCC): Primary | ICD-10-CM

## 2023-07-12 ENCOUNTER — TELEPHONE (OUTPATIENT)
Age: 63
End: 2023-07-12

## 2023-07-12 NOTE — TELEPHONE ENCOUNTER
OW called patient and started financial screening for medical bills (FA). OW explained the process to patient. Patient already has a FA application. OW explained the requirements. Pending POI and bank statements. Patient will call OW when she has pending documents. OW will follow up.

## 2023-07-18 ENCOUNTER — OFFICE VISIT (OUTPATIENT)
Age: 63
End: 2023-07-18

## 2023-07-18 VITALS
BODY MASS INDEX: 34.35 KG/M2 | OXYGEN SATURATION: 99 % | HEIGHT: 64 IN | SYSTOLIC BLOOD PRESSURE: 136 MMHG | WEIGHT: 201.2 LBS | TEMPERATURE: 98.1 F | DIASTOLIC BLOOD PRESSURE: 82 MMHG | HEART RATE: 88 BPM

## 2023-07-18 DIAGNOSIS — Z09 HOSPITAL DISCHARGE FOLLOW-UP: Primary | ICD-10-CM

## 2023-07-18 DIAGNOSIS — E11.65 UNCONTROLLED TYPE 2 DIABETES MELLITUS WITH HYPERGLYCEMIA (HCC): ICD-10-CM

## 2023-07-18 DIAGNOSIS — I25.2 HISTORY OF NON-ST ELEVATION MYOCARDIAL INFARCTION (NSTEMI): ICD-10-CM

## 2023-07-18 LAB
GLUCOSE, POC: 325 MG/DL
GLUCOSE, POC: NORMAL

## 2023-07-18 PROCEDURE — 99214 OFFICE O/P EST MOD 30 MIN: CPT | Performed by: FAMILY MEDICINE

## 2023-07-18 PROCEDURE — 3079F DIAST BP 80-89 MM HG: CPT | Performed by: FAMILY MEDICINE

## 2023-07-18 PROCEDURE — 1111F DSCHRG MED/CURRENT MED MERGE: CPT | Performed by: FAMILY MEDICINE

## 2023-07-18 PROCEDURE — 82962 GLUCOSE BLOOD TEST: CPT | Performed by: FAMILY MEDICINE

## 2023-07-18 PROCEDURE — 3075F SYST BP GE 130 - 139MM HG: CPT | Performed by: FAMILY MEDICINE

## 2023-07-18 PROCEDURE — 3046F HEMOGLOBIN A1C LEVEL >9.0%: CPT | Performed by: FAMILY MEDICINE

## 2023-07-18 RX ORDER — GLIMEPIRIDE 2 MG/1
TABLET ORAL
Qty: 30 TABLET | Refills: 3 | Status: SHIPPED | OUTPATIENT
Start: 2023-07-18

## 2023-07-18 ASSESSMENT — PATIENT HEALTH QUESTIONNAIRE - PHQ9
SUM OF ALL RESPONSES TO PHQ QUESTIONS 1-9: 0
2. FEELING DOWN, DEPRESSED OR HOPELESS: 0
SUM OF ALL RESPONSES TO PHQ QUESTIONS 1-9: 0
SUM OF ALL RESPONSES TO PHQ9 QUESTIONS 1 & 2: 0
1. LITTLE INTEREST OR PLEASURE IN DOING THINGS: 0
SUM OF ALL RESPONSES TO PHQ QUESTIONS 1-9: 0
SUM OF ALL RESPONSES TO PHQ QUESTIONS 1-9: 0

## 2023-07-18 ASSESSMENT — ENCOUNTER SYMPTOMS
COUGH: 0
DIARRHEA: 0
CONSTIPATION: 0
SHORTNESS OF BREATH: 0
NAUSEA: 0
ABDOMINAL PAIN: 0

## 2023-07-18 NOTE — PROGRESS NOTES
Monaghislaine Alfredo seen at d/c, full name and  verified, given After visit Summary and reviewed today's visit with patient along with instructions on when it is recommended to come back. I reviewed the medication list with the patient to ensure she knows how to and when to take her medications. Glimepiride Side effects, mechanisms of action and medication compliance were reiterated to ensure proper understanding. No [employer] coupon available for this medication. Patient connected with Gloria Valentin in regards to her Pharmaceutical Assistance Application.    Urban Caputo RN

## 2023-07-18 NOTE — TELEPHONE ENCOUNTER
T/C made to the patient to follow-up after her [facility] ED to hospital admission from 23 through 07-10-23 for c/o: chest pain radiating to her L jaw and shoulder. Dx: NSTEMI    07-10-23 HgbA1C: 10.8    New Rxs: Aspirin, Atorvastatin, Metoprolol tartrate. Continue Amlodipine-Benazepril and Metformin. The patient stated that she is feeling well today and and is not having any pain, difficulty breathing or swelling in lher ower legs or ankles. We reviewed the medication list in her chart and the patient confirmed that she is taking Aspirin, Atorvastatin, Metoprolol tartrate, Amlodipine-benazepril and Metformin as prescribed. She stated that she is out of Januvia and has not applied to a patient assistance program yet for [facility]. Patient's enrollment in a patient assistance program for Januvia . Per chart review, she was given a [employer] patient assistance program application during discharge on 23. The patient stated that she did not receive the application and that she has been calling me recently to request one. We reviewed that my work phone number is now [phone] and the patient agreed to have the application sent to her email address: [email or web address]. She understands that she may return the completed application to me by email, fax, mail or in person at one of our clinic sites. We reviewed the discharge recommendation that the patient follow-up outpatient with Cardiology and the patient stated that she will call the office of Dr. Sulma Melendez, [facility], to schedule her follow-up appointment. Per the patient, she knows to follow a diabetic diet and does not need any additional review of this topic. She also stated that she will try to purchase a glucometer soon and begin checking her blood sugar at least daily and when she feels that it may be high or low.  We also reviewed the importance of keeping a log of the readings that she can bring with her to her medical

## 2023-07-18 NOTE — PROGRESS NOTES
Chief Complaint   Patient presents with    Follow-Up from Hospital     Patient was hospitalized for NSTEMI for three days (7/07/23)     Results for orders placed or performed in visit on 07/18/23   AMB POC GLUCOSE BLOOD, BY GLUCOSE MONITORING DEVICE   Result Value Ref Range    Glucose, POC     AMB POC GLUCOSE BLOOD, BY GLUCOSE MONITORING DEVICE   Result Value Ref Range    Glucose,  MG/DL     Nonfasting    Coordination of Care  1. Have you been to the ER, urgent care clinic since your last visit? Hospitalized since your last visit? YES Hospitalized 7/07/23 for NSTEMI    2. Have you seen or consulted any other health care providers outside of the [address] [place] [address] since your last visit? Include any pap smears or colon screening. No  Does the patient need refills? Yes    Januvia       Learning Assessment Complete?  yes  Depression Screening complete in the past 12 months? yes

## 2023-07-18 NOTE — PROGRESS NOTES
Post-Discharge Transitional Care Follow Up      Anna Cross   YOB: 1960    Date of Office Visit:  7/18/2023  Date of Hospital Admission: 7/7/23  Date of Hospital Discharge: 7/10/23  Readmission Risk Score (high >=14%. Medium >=10%):Readmission Risk Score: 6.7      1. Hospital discharge follow-up  Patient has been compliant with medications and has followup with Cardiology scheduled. - [place] DISCHARGE MEDS RECONCILED W/ CURRENT OUTPATIENT MED LIST    2. History of non-ST elevation myocardial infarction (NSTEMI)  No further symptoms. On beta blocker, ASA, and maximal statin. 3. Uncontrolled type 2 diabetes mellitus with hyperglycemia (HCC)  Currently uncontrolled. . Saqib Gilmore A1C 10.8  Returned PAP application for Rybelsus today, which is meant to replace Januvia. In the interim, will restart Glimeperide 2mg once daily with largest meal.  Pt is starting a fruit/vegetable based diet and resuming walking. Follow up with Dr Ashley Ortiz in October, sooner with acute concerns. - AMB POC GLUCOSE BLOOD, BY GLUCOSE MONITORING DEVICE  - AMB POC GLUCOSE BLOOD, BY GLUCOSE MONITORING DEVICE  - glimepiride (AMARYL) 2 MG tablet; Take 1 tab with larges meal of the day for diabetes. Dispense: 30 tablet; Refill: 3      Medical Decision Making: moderate complexity  Return in about 3 months (around 10/18/2023) for DM2/STEMI followup with Dr Ashley Ortiz..           Subjective:   HPI    Inpatient course: Discharge summary reviewed- see chart. Interval history/Current status: Was feeling a little sluggish at first, feeling back to normal now. No diabetes symptoms. She had a fruit and bread pudding prior to her appointment. She is looking into a diet that is vegetable based. She used to do yoga, but now is walking more.     Patient Active Problem List   Diagnosis    Severe obesity ([address])    Type 2 diabetes mellitus without complication (HCC)    Trichomonal vulvovaginitis    Essential hypertension, benign    BMI 37.0-37.9,

## 2023-07-20 RX ORDER — ORAL SEMAGLUTIDE 3 MG/1
3 TABLET ORAL DAILY
Qty: 90 TABLET | Refills: 3
Start: 2023-07-20

## 2023-07-20 NOTE — PROGRESS NOTES
TPC medication to be ordered for pt:  Semaglutide (Rybelsus) 3 mg tab si po daily  Routing to PAP coordinator to process the rx request, thank you

## 2023-07-31 ENCOUNTER — OFFICE VISIT (OUTPATIENT)
Age: 63
End: 2023-07-31

## 2023-07-31 VITALS
WEIGHT: 204 LBS | DIASTOLIC BLOOD PRESSURE: 64 MMHG | HEART RATE: 88 BPM | OXYGEN SATURATION: 97 % | HEIGHT: 64 IN | SYSTOLIC BLOOD PRESSURE: 130 MMHG | BODY MASS INDEX: 34.83 KG/M2

## 2023-07-31 DIAGNOSIS — R77.8 ELEVATED TROPONIN: ICD-10-CM

## 2023-07-31 DIAGNOSIS — I10 ESSENTIAL HYPERTENSION, BENIGN: ICD-10-CM

## 2023-07-31 DIAGNOSIS — E11.21 TYPE 2 DIABETES WITH NEPHROPATHY (HCC): ICD-10-CM

## 2023-07-31 DIAGNOSIS — I21.4 NSTEMI (NON-ST ELEVATED MYOCARDIAL INFARCTION) (HCC): Primary | ICD-10-CM

## 2023-07-31 PROCEDURE — 99214 OFFICE O/P EST MOD 30 MIN: CPT | Performed by: INTERNAL MEDICINE

## 2023-07-31 PROCEDURE — 3046F HEMOGLOBIN A1C LEVEL >9.0%: CPT | Performed by: INTERNAL MEDICINE

## 2023-07-31 PROCEDURE — 3078F DIAST BP <80 MM HG: CPT | Performed by: INTERNAL MEDICINE

## 2023-07-31 PROCEDURE — 3075F SYST BP GE 130 - 139MM HG: CPT | Performed by: INTERNAL MEDICINE

## 2023-07-31 RX ORDER — MULTIVIT WITH MINERALS/LUTEIN
1000 TABLET ORAL DAILY
COMMUNITY

## 2023-07-31 ASSESSMENT — ENCOUNTER SYMPTOMS
WHEEZING: 0
CHEST TIGHTNESS: 0
SHORTNESS OF BREATH: 0

## 2023-08-09 PROBLEM — R79.89 ELEVATED TROPONIN: Status: RESOLVED | Noted: 2023-07-07 | Resolved: 2023-08-09

## 2023-08-09 PROBLEM — R77.8 ELEVATED TROPONIN: Status: RESOLVED | Noted: 2023-07-07 | Resolved: 2023-08-09

## 2023-08-22 ENCOUNTER — TELEPHONE (OUTPATIENT)
Age: 63
End: 2023-08-22

## 2023-08-22 NOTE — TELEPHONE ENCOUNTER
OW called patient to follow up with medical bills. An appointment has been made for 8/24/23 at 6:26BI to complete application. Pending July POI and bank statement.

## 2023-08-24 ENCOUNTER — OFFICE VISIT (OUTPATIENT)
Age: 63
End: 2023-08-24

## 2023-08-24 DIAGNOSIS — Z71.89 COUNSELING AND COORDINATION OF CARE: Primary | ICD-10-CM

## 2023-08-24 PROCEDURE — 99080 SPECIAL REPORTS OR FORMS: CPT | Performed by: PHYSICIAN ASSISTANT

## 2023-08-24 ASSESSMENT — SOCIAL DETERMINANTS OF HEALTH (SDOH): HOW HARD IS IT FOR YOU TO PAY FOR THE VERY BASICS LIKE FOOD, HOUSING, MEDICAL CARE, AND HEATING?: HARD

## 2023-08-24 NOTE — PROGRESS NOTES
BS Financial Assistance application has been completed.    Patient will bring it to a Financial Counselor at the hospital.

## 2023-12-01 ENCOUNTER — OFFICE VISIT (OUTPATIENT)
Age: 63
End: 2023-12-01

## 2023-12-01 ENCOUNTER — HOSPITAL ENCOUNTER (OUTPATIENT)
Facility: HOSPITAL | Age: 63
Setting detail: SPECIMEN
Discharge: HOME | End: 2023-12-04

## 2023-12-01 VITALS
DIASTOLIC BLOOD PRESSURE: 88 MMHG | WEIGHT: 211.3 LBS | OXYGEN SATURATION: 100 % | TEMPERATURE: 97.3 F | HEART RATE: 85 BPM | HEIGHT: 64 IN | BODY MASS INDEX: 36.07 KG/M2 | SYSTOLIC BLOOD PRESSURE: 151 MMHG

## 2023-12-01 DIAGNOSIS — Z23 NEEDS FLU SHOT: ICD-10-CM

## 2023-12-01 DIAGNOSIS — I25.2 HISTORY OF NON-ST ELEVATION MYOCARDIAL INFARCTION (NSTEMI): ICD-10-CM

## 2023-12-01 DIAGNOSIS — E78.00 PURE HYPERCHOLESTEROLEMIA, UNSPECIFIED: ICD-10-CM

## 2023-12-01 DIAGNOSIS — E11.29 TYPE 2 DIABETES MELLITUS WITH OTHER DIABETIC KIDNEY COMPLICATION (HCC): ICD-10-CM

## 2023-12-01 DIAGNOSIS — E11.65 UNCONTROLLED TYPE 2 DIABETES MELLITUS WITH HYPERGLYCEMIA (HCC): ICD-10-CM

## 2023-12-01 DIAGNOSIS — E11.29 TYPE 2 DIABETES MELLITUS WITH OTHER DIABETIC KIDNEY COMPLICATION (HCC): Primary | ICD-10-CM

## 2023-12-01 DIAGNOSIS — I10 ESSENTIAL (PRIMARY) HYPERTENSION: ICD-10-CM

## 2023-12-01 LAB — GLUCOSE, POC: 177 MG/DL

## 2023-12-01 PROCEDURE — 90686 IIV4 VACC NO PRSV 0.5 ML IM: CPT | Performed by: FAMILY MEDICINE

## 2023-12-01 PROCEDURE — 3079F DIAST BP 80-89 MM HG: CPT | Performed by: FAMILY MEDICINE

## 2023-12-01 PROCEDURE — 80061 LIPID PANEL: CPT

## 2023-12-01 PROCEDURE — 83036 HEMOGLOBIN GLYCOSYLATED A1C: CPT

## 2023-12-01 PROCEDURE — 36415 COLL VENOUS BLD VENIPUNCTURE: CPT

## 2023-12-01 PROCEDURE — 80053 COMPREHEN METABOLIC PANEL: CPT

## 2023-12-01 PROCEDURE — 82962 GLUCOSE BLOOD TEST: CPT | Performed by: FAMILY MEDICINE

## 2023-12-01 PROCEDURE — 3046F HEMOGLOBIN A1C LEVEL >9.0%: CPT | Performed by: FAMILY MEDICINE

## 2023-12-01 PROCEDURE — 99214 OFFICE O/P EST MOD 30 MIN: CPT | Performed by: FAMILY MEDICINE

## 2023-12-01 PROCEDURE — 90471 IMMUNIZATION ADMIN: CPT | Performed by: FAMILY MEDICINE

## 2023-12-01 PROCEDURE — 3077F SYST BP >= 140 MM HG: CPT | Performed by: FAMILY MEDICINE

## 2023-12-01 RX ORDER — GLIMEPIRIDE 2 MG/1
4 TABLET ORAL
Qty: 90 TABLET | Refills: 3 | Status: SHIPPED | OUTPATIENT
Start: 2023-12-01

## 2023-12-01 RX ORDER — ATORVASTATIN CALCIUM 80 MG/1
80 TABLET, FILM COATED ORAL NIGHTLY
Qty: 90 TABLET | Refills: 3 | Status: SHIPPED | OUTPATIENT
Start: 2023-12-01

## 2023-12-01 RX ORDER — AMLODIPINE BESYLATE AND BENAZEPRIL HYDROCHLORIDE 5; 20 MG/1; MG/1
1 CAPSULE ORAL DAILY
Qty: 90 CAPSULE | Refills: 3 | Status: SHIPPED | OUTPATIENT
Start: 2023-12-01

## 2023-12-01 RX ORDER — ASPIRIN 81 MG/1
81 TABLET, CHEWABLE ORAL DAILY
Qty: 90 TABLET | Refills: 3 | Status: SHIPPED | OUTPATIENT
Start: 2023-12-01

## 2023-12-01 SDOH — ECONOMIC STABILITY: FOOD INSECURITY: WITHIN THE PAST 12 MONTHS, THE FOOD YOU BOUGHT JUST DIDN'T LAST AND YOU DIDN'T HAVE MONEY TO GET MORE.: SOMETIMES TRUE

## 2023-12-01 SDOH — ECONOMIC STABILITY: INCOME INSECURITY: HOW HARD IS IT FOR YOU TO PAY FOR THE VERY BASICS LIKE FOOD, HOUSING, MEDICAL CARE, AND HEATING?: HARD

## 2023-12-01 SDOH — ECONOMIC STABILITY: HOUSING INSECURITY
IN THE LAST 12 MONTHS, WAS THERE A TIME WHEN YOU DID NOT HAVE A STEADY PLACE TO SLEEP OR SLEPT IN A SHELTER (INCLUDING NOW)?: NO

## 2023-12-01 SDOH — ECONOMIC STABILITY: FOOD INSECURITY: WITHIN THE PAST 12 MONTHS, YOU WORRIED THAT YOUR FOOD WOULD RUN OUT BEFORE YOU GOT MONEY TO BUY MORE.: SOMETIMES TRUE

## 2023-12-01 ASSESSMENT — PATIENT HEALTH QUESTIONNAIRE - PHQ9
SUM OF ALL RESPONSES TO PHQ QUESTIONS 1-9: 0
SUM OF ALL RESPONSES TO PHQ9 QUESTIONS 1 & 2: 0
2. FEELING DOWN, DEPRESSED OR HOPELESS: 0
1. LITTLE INTEREST OR PLEASURE IN DOING THINGS: 0

## 2023-12-01 ASSESSMENT — ENCOUNTER SYMPTOMS
CONSTIPATION: 0
NAUSEA: 0
DIARRHEA: 0
SHORTNESS OF BREATH: 0
ABDOMINAL PAIN: 0
COUGH: 1

## 2023-12-01 NOTE — PROGRESS NOTES
Patient requests flu vaccine. Provider cleared patient to receive vaccines today. Consent form completed. Patient denies fever, egg allergy, or reactions to any past immunization. Immunization given per protocol and recorded in [address] [place] [address] [place] and EMR. Vaccine Information Sheet given to patient and possible side effects explained. Reviewed signs/symptoms of allergic reaction indicating need to be seen in ER. Patient verbalized understanding. Vaccine administered in right deltoid. Patient monitored in clinic for 15 minutes after vaccine administration. Patient had no adverse reaction at time of discharge.     Abbi Pryor RN

## 2023-12-01 NOTE — PROGRESS NOTES
Vandana Toscano is a 63 y.o. female   Chief Complaint   Patient presents with    Diabetes     Pt is here for follow up for diabetes          ASSESSMENT AND PLAN:    1. Type 2 diabetes mellitus with other diabetic kidney complication (HCC)  Last A1C 10.8 (July 2023) Fasting  today. Daz 3d pap application may have been denied. Increase amaryl to 4mg daily. Continue metformin.  - AMB POC GLUCOSE BLOOD, BY GLUCOSE MONITORING DEVICE  - Hemoglobin A1C; Future  - Comprehensive Metabolic Panel; Future  - metFORMIN (GLUCOPHAGE) 1000 MG tablet; Take 1 tablet by mouth 2 times daily (with meals)  Dispense: 180 tablet; Refill: 3    2. Needs flu shot  - Influenza, FLUARIX, (age 10 mo+),  IM, Preservative Free, 0.5 mL    3. History of non-ST elevation myocardial infarction (NSTEMI)  - Lipid Panel; Future  - aspirin 81 MG chewable tablet; Take 1 tablet by mouth daily  Dispense: 90 tablet; Refill: 3    4. Pure hypercholesterolemia, unspecified  - atorvastatin (LIPITOR) 80 MG tablet; Take 1 tablet by mouth nightly  Dispense: 90 tablet; Refill: 3    5. Uncontrolled type 2 diabetes mellitus with hyperglycemia (HCC)  - glimepiride (AMARYL) 2 MG tablet; Take 2 tablets by mouth every morning (before breakfast) Take 1 tab with larges meal of the day for diabetes. Dispense: 90 tablet; Refill: 3    6. Essential (primary) hypertension  - amLODIPine-benazepril (LOTREL) 5-20 MG per capsule; Take 1 capsule by mouth daily  Dispense: 90 capsule; Refill: 3  - metoprolol tartrate (LOPRESSOR) 25 MG tablet; Take 0.5 tablets by mouth 2 times daily  Dispense: 90 tablet; Refill: 1    Return for BP and glucose check in 6-8 weeks. SUBJECTIVE:    HPI:  Vandana Toscano is a 63 y.o. female who presents for followup on HTN, DM2 in the setting of h/o NSTEMI. Trying to get back on track with her diet. Things have been \"juan\". She had been out of BP med for a month, just refilled it. She has been out of the lipitor as well, needs more.     Has had

## 2023-12-02 LAB
ALBUMIN SERPL-MCNC: 3.8 G/DL (ref 3.5–5)
ALBUMIN/GLOB SERPL: 1.1 (ref 1.1–2.2)
ALP SERPL-CCNC: 118 U/L (ref 45–117)
ALT SERPL-CCNC: 18 U/L (ref 12–78)
ANION GAP SERPL CALC-SCNC: 8 MMOL/L (ref 5–15)
AST SERPL-CCNC: 13 U/L (ref 15–37)
BILIRUB SERPL-MCNC: 0.4 MG/DL (ref 0.2–1)
BUN SERPL-MCNC: 8 MG/DL (ref 6–20)
BUN/CREAT SERPL: 12 (ref 12–20)
CALCIUM SERPL-MCNC: 9.8 MG/DL (ref 8.5–10.1)
CHLORIDE SERPL-SCNC: 102 MMOL/L (ref 97–108)
CHOLEST SERPL-MCNC: 180 MG/DL
CO2 SERPL-SCNC: 27 MMOL/L (ref 21–32)
CREAT SERPL-MCNC: 0.67 MG/DL (ref 0.55–1.02)
EST. AVERAGE GLUCOSE BLD GHB EST-MCNC: 166 MG/DL
GLOBULIN SER CALC-MCNC: 3.4 G/DL (ref 2–4)
GLUCOSE SERPL-MCNC: 203 MG/DL (ref 65–100)
HBA1C MFR BLD: 7.4 % (ref 4–5.6)
HDLC SERPL-MCNC: 59 MG/DL
HDLC SERPL: 3.1 (ref 0–5)
LDLC SERPL CALC-MCNC: 101.2 MG/DL (ref 0–100)
POTASSIUM SERPL-SCNC: 4.1 MMOL/L (ref 3.5–5.1)
PROT SERPL-MCNC: 7.2 G/DL (ref 6.4–8.2)
SODIUM SERPL-SCNC: 137 MMOL/L (ref 136–145)
TRIGL SERPL-MCNC: 99 MG/DL
VLDLC SERPL CALC-MCNC: 19.8 MG/DL

## 2023-12-02 NOTE — PROGRESS NOTES
Chief Complaint   Patient presents with    Diabetes     Pt is here for follow up for diabetes     Immunizations     Influenza      Patient name and date of birth verified. Patient given an after visit summary, reviewed medications on how and when to take, coupons given to present to pharmacy for medication discount. Patient given food and financial resource list advised to reach out to each agencies at her convenience  for assistance. Patient advised to schedule next appointment before leaving clinic office. Patient verbalized understanding of all information given at time of visit.

## 2024-01-19 ENCOUNTER — OFFICE VISIT (OUTPATIENT)
Age: 64
End: 2024-01-19

## 2024-01-19 VITALS
OXYGEN SATURATION: 100 % | DIASTOLIC BLOOD PRESSURE: 85 MMHG | WEIGHT: 214.2 LBS | HEART RATE: 87 BPM | SYSTOLIC BLOOD PRESSURE: 147 MMHG | BODY MASS INDEX: 36.75 KG/M2 | TEMPERATURE: 98.1 F

## 2024-01-19 DIAGNOSIS — E11.29 TYPE 2 DIABETES MELLITUS WITH OTHER DIABETIC KIDNEY COMPLICATION (HCC): Primary | ICD-10-CM

## 2024-01-19 DIAGNOSIS — E55.9 VITAMIN D DEFICIENCY, UNSPECIFIED: ICD-10-CM

## 2024-01-19 DIAGNOSIS — I25.10 CORONARY ARTERY DISEASE INVOLVING NATIVE CORONARY ARTERY OF NATIVE HEART WITHOUT ANGINA PECTORIS: ICD-10-CM

## 2024-01-19 DIAGNOSIS — E78.00 PURE HYPERCHOLESTEROLEMIA, UNSPECIFIED: ICD-10-CM

## 2024-01-19 DIAGNOSIS — I10 ESSENTIAL (PRIMARY) HYPERTENSION: ICD-10-CM

## 2024-01-19 DIAGNOSIS — Z00.00 PHYSICAL EXAM, ROUTINE: ICD-10-CM

## 2024-01-19 LAB — GLUCOSE, POC: 234 MG/DL

## 2024-01-19 RX ORDER — ERGOCALCIFEROL 1.25 MG/1
50000 CAPSULE ORAL WEEKLY
Qty: 12 CAPSULE | Refills: 1 | Status: SHIPPED | OUTPATIENT
Start: 2024-01-19

## 2024-01-19 RX ORDER — METOPROLOL SUCCINATE 50 MG/1
50 TABLET, EXTENDED RELEASE ORAL DAILY
Qty: 90 TABLET | Refills: 1 | Status: SHIPPED | OUTPATIENT
Start: 2024-01-19

## 2024-01-19 ASSESSMENT — ENCOUNTER SYMPTOMS
SHORTNESS OF BREATH: 0
COUGH: 0
CHEST TIGHTNESS: 0

## 2024-01-19 ASSESSMENT — PATIENT HEALTH QUESTIONNAIRE - PHQ9
SUM OF ALL RESPONSES TO PHQ QUESTIONS 1-9: 0
1. LITTLE INTEREST OR PLEASURE IN DOING THINGS: 0
2. FEELING DOWN, DEPRESSED OR HOPELESS: 0
SUM OF ALL RESPONSES TO PHQ9 QUESTIONS 1 & 2: 0
SUM OF ALL RESPONSES TO PHQ QUESTIONS 1-9: 0

## 2024-01-19 NOTE — PROGRESS NOTES
Aziza Galeano (: 1960) is a 63 y.o. female, Established patient, here for evaluation of the following chief complaint(s):  Blood Pressure Check (Follow up for BP ), Diabetes (Follow up for type 2 diabetes), and Annual Exam (Here for an annual/work physical)       ASSESSMENT/PLAN:  1. Type 2 diabetes mellitus with other diabetic kidney complication (HCC)  Her A1c is improved and recommended she continue with diet, weight loss, exercise, and current regimen.  -     AMB POC GLUCOSE BLOOD, BY GLUCOSE MONITORING DEVICE  2. Coronary artery disease involving native coronary artery of native heart without angina pectoris  No new sx or angina, continue with current regimen.  3. Essential (primary) hypertension  Not controlled today but not consistent with metoprolol, will change to Toprol XL  4. Vitamin D deficiency, unspecified  Restart Rx  5. Pure hypercholesterolemia, unspecified  Continue with Lipitor high intensity.  6. Physical exam  Pre work.  Forms completed.    Return for follow up F2F in 3 months for DM and BP check.    SUBJECTIVE:  Follow up for DM and BP check.  DM: Patient is taking Amaryl 4 mg and Metformin 1000mg BID regularly.   Her diet varies.  Denies hypoglycemic spells. Not checking BG.  Rybelsus PAP denied.  CAD:  NSTEMI 2023.  Pt is taking ASA, Metoprolol, Lipitor (new higher dose).  Patient is able to walk a flight of stairs without any chest pain or PEREZ.  Cath 7/10/2023: diffuse CAD with 70% branch vessel.  Medical tx recommended.  HTN:  Patient is taking Lotrel, Metoprolol BID but frequently misses evening dose.  HLD: Taking Lipitor 80mg daily with some more aches but no weakness.  Untreated LDL of 205.  Vitamin D deficiency: She is not taking Rx and has noted more general muscle aches.  Physical exam:  Is applying for job with [employer]     Review of Systems   Constitutional:  Negative for unexpected weight change.   Eyes:  Negative for visual disturbance.   Respiratory:

## 2024-01-19 NOTE — PROGRESS NOTES
\"Have you been to the ER, urgent care clinic since your last visit?  Hospitalized since your last visit?\"    NO    “Have you seen or consulted any other health care providers outside of [facility] since your last visit?”    NO    “Have you had a colorectal cancer screening such as a colonoscopy/FIT/Cologuard?    YES - Type: FIT      Have you had a mammogram?”   NO     “Have you had a pap smear?”    NO    Results for orders placed or performed in visit on 01/19/24   AMB POC GLUCOSE BLOOD, BY GLUCOSE MONITORING DEVICE   Result Value Ref Range    Glucose,  MG/DL   Non-fasting glucose

## 2024-01-19 NOTE — PROGRESS NOTES
Patient name and date of birth verified.   Patient given an after visit summary, reviewed medications on how and when to take, coupons given to present to pharmacy for medication discount.  Advised to schedule next appointments before leaving clinic office.  Patient verbalized understanding of all information given at time of visit. Caroline Canales LPN    Physical forms completed by provider and copied for scanning.  Caroline Canales LPN

## 2024-01-23 RX ORDER — ERGOCALCIFEROL 1.25 MG/1
50000 CAPSULE ORAL WEEKLY
Qty: 12 CAPSULE | Refills: 0 | OUTPATIENT
Start: 2024-01-23

## 2024-02-08 ENCOUNTER — TRANSCRIBE ORDERS (OUTPATIENT)
Facility: HOSPITAL | Age: 64
End: 2024-02-08

## 2024-02-08 DIAGNOSIS — Z12.31 VISIT FOR SCREENING MAMMOGRAM: Primary | ICD-10-CM

## 2024-02-22 ENCOUNTER — HOSPITAL ENCOUNTER (OUTPATIENT)
Facility: HOSPITAL | Age: 64
Discharge: HOME | End: 2024-02-25
Attending: FAMILY MEDICINE

## 2024-02-22 VITALS — HEIGHT: 64 IN | BODY MASS INDEX: 37.22 KG/M2 | WEIGHT: 218 LBS

## 2024-02-22 DIAGNOSIS — Z12.31 VISIT FOR SCREENING MAMMOGRAM: ICD-10-CM

## 2024-02-22 PROCEDURE — 77063 BREAST TOMOSYNTHESIS BI: CPT

## 2024-02-23 ENCOUNTER — TRANSCRIBE ORDERS (OUTPATIENT)
Facility: HOSPITAL | Age: 64
End: 2024-02-23

## 2024-02-23 DIAGNOSIS — R92.8 ABNORMAL MAMMOGRAM OF RIGHT BREAST: Primary | ICD-10-CM

## 2024-02-26 ENCOUNTER — HOSPITAL ENCOUNTER (OUTPATIENT)
Facility: HOSPITAL | Age: 64
Discharge: HOME | End: 2024-02-29
Attending: FAMILY MEDICINE

## 2024-02-26 ENCOUNTER — HOSPITAL ENCOUNTER (OUTPATIENT)
Facility: HOSPITAL | Age: 64
Discharge: HOME | End: 2024-02-29

## 2024-02-26 VITALS — BODY MASS INDEX: 37.22 KG/M2 | WEIGHT: 218 LBS | HEIGHT: 64 IN

## 2024-02-26 DIAGNOSIS — R92.8 ABNORMAL MAMMOGRAM OF RIGHT BREAST: ICD-10-CM

## 2024-02-26 PROCEDURE — 76642 ULTRASOUND BREAST LIMITED: CPT

## 2024-02-26 PROCEDURE — G0279 TOMOSYNTHESIS, MAMMO: HCPCS

## 2024-03-22 ENCOUNTER — HOSPITAL ENCOUNTER (OUTPATIENT)
Facility: HOSPITAL | Age: 64
Setting detail: SPECIMEN
Discharge: HOME | End: 2024-03-25

## 2024-03-22 ENCOUNTER — NURSE ONLY (OUTPATIENT)
Age: 64
End: 2024-03-22

## 2024-03-22 ENCOUNTER — OFFICE VISIT (OUTPATIENT)
Age: 64
End: 2024-03-22

## 2024-03-22 VITALS
HEART RATE: 78 BPM | DIASTOLIC BLOOD PRESSURE: 86 MMHG | OXYGEN SATURATION: 98 % | WEIGHT: 217 LBS | BODY MASS INDEX: 37.25 KG/M2 | SYSTOLIC BLOOD PRESSURE: 128 MMHG | TEMPERATURE: 97.7 F

## 2024-03-22 DIAGNOSIS — E11.29 TYPE 2 DIABETES MELLITUS WITH OTHER DIABETIC KIDNEY COMPLICATION (HCC): ICD-10-CM

## 2024-03-22 DIAGNOSIS — Z71.89 COUNSELING AND COORDINATION OF CARE: Primary | ICD-10-CM

## 2024-03-22 DIAGNOSIS — R21 RASH: Primary | ICD-10-CM

## 2024-03-22 DIAGNOSIS — R21 RASH: ICD-10-CM

## 2024-03-22 LAB
ALBUMIN SERPL-MCNC: 4.4 G/DL (ref 3.5–5)
ALBUMIN/GLOB SERPL: 1.1 (ref 1.1–2.2)
ALP SERPL-CCNC: 138 U/L (ref 45–117)
ALT SERPL-CCNC: 31 U/L (ref 12–78)
ANION GAP SERPL CALC-SCNC: 6 MMOL/L (ref 5–15)
AST SERPL-CCNC: 22 U/L (ref 15–37)
BASOPHILS # BLD: 0.1 K/UL (ref 0–0.1)
BASOPHILS NFR BLD: 1 % (ref 0–1)
BILIRUB SERPL-MCNC: 0.5 MG/DL (ref 0.2–1)
BUN SERPL-MCNC: 11 MG/DL (ref 6–20)
BUN/CREAT SERPL: 13 (ref 12–20)
CALCIUM SERPL-MCNC: 9.9 MG/DL (ref 8.5–10.1)
CHLORIDE SERPL-SCNC: 103 MMOL/L (ref 97–108)
CO2 SERPL-SCNC: 28 MMOL/L (ref 21–32)
CREAT SERPL-MCNC: 0.85 MG/DL (ref 0.55–1.02)
DIFFERENTIAL METHOD BLD: ABNORMAL
EOSINOPHIL # BLD: 0.1 K/UL (ref 0–0.4)
EOSINOPHIL NFR BLD: 1 % (ref 0–7)
ERYTHROCYTE [DISTWIDTH] IN BLOOD BY AUTOMATED COUNT: 13 % (ref 11.5–14.5)
GLOBULIN SER CALC-MCNC: 3.9 G/DL (ref 2–4)
GLUCOSE SERPL-MCNC: 191 MG/DL (ref 65–100)
GLUCOSE, POC: 228 MG/DL
HCT VFR BLD AUTO: 44 % (ref 35–47)
HGB BLD-MCNC: 14.5 G/DL (ref 11.5–16)
IMM GRANULOCYTES # BLD AUTO: 0 K/UL (ref 0–0.04)
IMM GRANULOCYTES NFR BLD AUTO: 0 % (ref 0–0.5)
LYMPHOCYTES # BLD: 2.7 K/UL (ref 0.8–3.5)
LYMPHOCYTES NFR BLD: 37 % (ref 12–49)
MCH RBC QN AUTO: 26.9 PG (ref 26–34)
MCHC RBC AUTO-ENTMCNC: 33 G/DL (ref 30–36.5)
MCV RBC AUTO: 81.6 FL (ref 80–99)
MONOCYTES # BLD: 0.4 K/UL (ref 0–1)
MONOCYTES NFR BLD: 5 % (ref 5–13)
NEUTS SEG # BLD: 4.1 K/UL (ref 1.8–8)
NEUTS SEG NFR BLD: 56 % (ref 32–75)
NRBC # BLD: 0 K/UL (ref 0–0.01)
NRBC BLD-RTO: 0 PER 100 WBC
PLATELET # BLD AUTO: 406 K/UL (ref 150–400)
PMV BLD AUTO: 9.8 FL (ref 8.9–12.9)
POTASSIUM SERPL-SCNC: 4 MMOL/L (ref 3.5–5.1)
PROT SERPL-MCNC: 8.3 G/DL (ref 6.4–8.2)
RBC # BLD AUTO: 5.39 M/UL (ref 3.8–5.2)
SODIUM SERPL-SCNC: 137 MMOL/L (ref 136–145)
WBC # BLD AUTO: 7.4 K/UL (ref 3.6–11)

## 2024-03-22 PROCEDURE — 99214 OFFICE O/P EST MOD 30 MIN: CPT | Performed by: FAMILY MEDICINE

## 2024-03-22 PROCEDURE — 85025 COMPLETE CBC W/AUTO DIFF WBC: CPT

## 2024-03-22 PROCEDURE — 82962 GLUCOSE BLOOD TEST: CPT | Performed by: FAMILY MEDICINE

## 2024-03-22 PROCEDURE — 36415 COLL VENOUS BLD VENIPUNCTURE: CPT

## 2024-03-22 PROCEDURE — 3078F DIAST BP <80 MM HG: CPT | Performed by: FAMILY MEDICINE

## 2024-03-22 PROCEDURE — 3074F SYST BP LT 130 MM HG: CPT | Performed by: FAMILY MEDICINE

## 2024-03-22 PROCEDURE — 80053 COMPREHEN METABOLIC PANEL: CPT

## 2024-03-22 RX ORDER — CLOBETASOL PROPIONATE 0.5 MG/G
AEROSOL, FOAM TOPICAL
Qty: 50 G | Refills: 0 | Status: SHIPPED | OUTPATIENT
Start: 2024-03-22 | End: 2024-03-22 | Stop reason: SDUPTHER

## 2024-03-22 RX ORDER — CLOBETASOL PROPIONATE 0.5 MG/G
AEROSOL, FOAM TOPICAL
Qty: 50 G | Refills: 0 | Status: SHIPPED | OUTPATIENT
Start: 2024-03-22

## 2024-03-22 RX ORDER — BETAMETHASONE DIPROPIONATE 0.5 MG/G
CREAM TOPICAL
Qty: 50 G | Refills: 1 | Status: SHIPPED | OUTPATIENT
Start: 2024-03-22 | End: 2024-03-22 | Stop reason: SDUPTHER

## 2024-03-22 RX ORDER — BETAMETHASONE DIPROPIONATE 0.5 MG/G
CREAM TOPICAL
Qty: 50 G | Refills: 1 | Status: SHIPPED | OUTPATIENT
Start: 2024-03-22 | End: 2024-04-21

## 2024-03-22 ASSESSMENT — PATIENT HEALTH QUESTIONNAIRE - PHQ9
SUM OF ALL RESPONSES TO PHQ QUESTIONS 1-9: 0
2. FEELING DOWN, DEPRESSED OR HOPELESS: NOT AT ALL
SUM OF ALL RESPONSES TO PHQ QUESTIONS 1-9: 0
1. LITTLE INTEREST OR PLEASURE IN DOING THINGS: NOT AT ALL
SUM OF ALL RESPONSES TO PHQ9 QUESTIONS 1 & 2: 0
SUM OF ALL RESPONSES TO PHQ QUESTIONS 1-9: 0
SUM OF ALL RESPONSES TO PHQ QUESTIONS 1-9: 0

## 2024-03-22 NOTE — PROGRESS NOTES
[facility] on bilateral arms and her back and face. Patient states she is now getting her medication from a different pharmacy. Provider evaluated patient; determined patient is not having an acute anaphylactic reaction. Patient added on to provider schedule to be seen later today.   AZAM SMITH RN

## 2024-03-22 NOTE — PROGRESS NOTES
\"Have you been to the ER, urgent care clinic since your last visit?  Hospitalized since your last visit?\"    NO    “Have you seen or consulted any other health care providers outside of [facility] since your last visit?”    NO     “Have you had a pap smear?”    yes    Date of last Cervical Cancer screen (HPV or PAP): 12/20/2016         “Have you had a colorectal cancer screening such as a colonoscopy/FIT/Cologuard?    NO    No colonoscopy on file  No cologuard on file  Date of last FIT: 1/19/2021   No flexible sigmoidoscopy on file     Results for orders placed or performed in visit on 03/22/24   AMB POC GLUCOSE BLOOD, BY GLUCOSE MONITORING DEVICE   Result Value Ref Range    Glucose,  MG/DL     Non fasting        Click Here for Release of Records Request

## 2024-03-22 NOTE — PROGRESS NOTES
Aziza Galeano (: 1960) is a 63 y.o. female, Established patient, here for evaluation of the following chief complaint(s):  No chief complaint on file.       ASSESSMENT/PLAN:  1. Rash  Lichenoid rash that has progressed.  Ddx include drug reaction (metoprolol was recently changed, so will hold) and , psoriasis.  However with large lesion on Lt lower abdomen will check labs for possible cutaneous lymphoma.  -     CBC with Auto Differential; Future  -     Comprehensive Metabolic Panel; Future  2. Type 2 diabetes mellitus with other diabetic kidney complication (HCC)  -     AMB POC GLUCOSE BLOOD, BY GLUCOSE MONITORING DEVICE      Return for follow up F2F in 2 weeks for rash (acute or chronic care).    SUBJECTIVE:  Acute visit for rash  Rash: 1 month ago started with pruritic papules on legs.  Tried some infrared light but not much better.  Then rash spread to arms and scalp.  Is having some red areas on face also.  States she did notice a red area on Lt Lower abdomen x 2 months.    Review of Systems   Constitutional:  Negative for chills, fever and unexpected weight change.     OBJECTIVE:  Blood pressure 128/86, pulse 78, temperature 97.7 °F (36.5 °C), temperature source Temporal, weight 98.4 kg (217 lb), SpO2 98 %.  Physical Exam  CONSTITUTIONAL:  Well developed.  No apparent distress.  PSYCHIATRIC: Oriented to time, place, person & situation.  Appropriate mood and affect.  SKIN:  scalp within hair line with papular, flaking rash.  Face with moist, mildly erythematous areas around eyes, nasal folds.  On forearms and shoulders has lichenoid papules.  B legs with round papules with central dry/flaking.  Lt lower abdomen in abdominal fold 8 cm lesion with raised erythematous edges.    Results for orders placed or performed in visit on 24   AMB POC GLUCOSE BLOOD, BY GLUCOSE MONITORING DEVICE   Result Value Ref Range    Glucose,  MG/DL          An electronic signature was used to authenticate this

## 2024-03-22 NOTE — PROGRESS NOTES
Name and  confirmed w/ patient. An After Visit Summary was provided and all discharge instructions were reviewed with the patient including: new medications, f/up appt, Goodrx coupon use, and where to go for off-site lab draws . Lab requisition providedTime for questions and answers provided, patient verbalized understanding. Patient discharged from clinic in stable condition.

## 2024-03-29 ENCOUNTER — OFFICE VISIT (OUTPATIENT)
Age: 64
End: 2024-03-29

## 2024-03-29 VITALS
WEIGHT: 216 LBS | HEART RATE: 82 BPM | TEMPERATURE: 97.4 F | SYSTOLIC BLOOD PRESSURE: 147 MMHG | OXYGEN SATURATION: 96 % | BODY MASS INDEX: 37.08 KG/M2 | DIASTOLIC BLOOD PRESSURE: 80 MMHG

## 2024-03-29 DIAGNOSIS — R21 RASH: Primary | ICD-10-CM

## 2024-03-29 PROCEDURE — 99213 OFFICE O/P EST LOW 20 MIN: CPT | Performed by: FAMILY MEDICINE

## 2024-03-29 PROCEDURE — 3077F SYST BP >= 140 MM HG: CPT | Performed by: FAMILY MEDICINE

## 2024-03-29 PROCEDURE — 3079F DIAST BP 80-89 MM HG: CPT | Performed by: FAMILY MEDICINE

## 2024-03-29 ASSESSMENT — PATIENT HEALTH QUESTIONNAIRE - PHQ9
SUM OF ALL RESPONSES TO PHQ QUESTIONS 1-9: 0
SUM OF ALL RESPONSES TO PHQ9 QUESTIONS 1 & 2: 0
SUM OF ALL RESPONSES TO PHQ QUESTIONS 1-9: 0
2. FEELING DOWN, DEPRESSED OR HOPELESS: NOT AT ALL
1. LITTLE INTEREST OR PLEASURE IN DOING THINGS: NOT AT ALL
SUM OF ALL RESPONSES TO PHQ QUESTIONS 1-9: 0
SUM OF ALL RESPONSES TO PHQ QUESTIONS 1-9: 0

## 2024-03-29 NOTE — PROGRESS NOTES
Patient discharged with AVS. Patient's name and  verified. Patient instructed to follow-up as scheduled. Patient given an opportunity to voice questions/concerns. No questions at this time.

## 2024-03-29 NOTE — PROGRESS NOTES
Aziza Galeano (: 1960) is a 63 y.o. female, Established patient, here for evaluation of the following chief complaint(s):  Rash (2 week follow up for rash, improvement with medication )       ASSESSMENT/PLAN:  1. Rash  Much improved.  Labs are reassuring.  Favor psoriasis vs drug reaction.  Continue with current tx.  Could consider start Coreg BID at next visit for her CAD, will avoid starting a new medication due to the severity of reaction.  If not cleared discussed seeing Derm for psoriasis tx.    Return for follow up as scheduled .    SUBJECTIVE:  Follow up on BP and rash  Rash:  She stopped metoprolol and started steroid creams.  Rash on abdomen, arms, and legs have improved.  Still struggling with pruritic scalp.  1 month ago started with pruritic papules on legs.  Then developed rash spread to arms and scalp.  Had red area on Lt Lower abdomen x 2 months.    Review of Systems   Constitutional:  Negative for diaphoresis, fatigue, fever and unexpected weight change.     OBJECTIVE:  Blood pressure (!) 147/80, pulse 82, temperature 97.4 °F (36.3 °C), temperature source Temporal, weight 98 kg (216 lb), SpO2 96 %.  Physical Exam  CONSTITUTIONAL:  Well developed.  No apparent distress.  PSYCHIATRIC: Oriented to time, place, person & situation.  Appropriate mood and affect.  SKIN:  scalp within hair line with papular, flaking rash.  Face with moist, mildly erythematous areas around eyes, nasal folds.  On forearms and shoulders has lichenoid papules.  B legs with round papules with central dry/flaking much less than last visit.  Lt lower abdomen in abdominal fold 8 cm lesion that is almost cleared since last visit.     No results found for any visits on 24.       An electronic signature was used to authenticate this note.  -- Henry Todd MD

## 2024-03-29 NOTE — PROGRESS NOTES
\"Have you been to the ER, urgent care clinic since your last visit?  Hospitalized since your last visit?\"    NO    “Have you seen or consulted any other health care providers outside of [facility] since your last visit?”    NO     “Have you had a pap smear?”    NO    Date of last Cervical Cancer screen (HPV or PAP): 12/20/2016         “Have you had a colorectal cancer screening such as a colonoscopy/FIT/Cologuard?    NO    No colonoscopy on file  No cologuard on file  Date of last FIT: 1/19/2021   No flexible sigmoidoscopy on file         Click Here for Release of Records Request

## 2024-04-26 ENCOUNTER — OFFICE VISIT (OUTPATIENT)
Age: 64
End: 2024-04-26

## 2024-04-26 VITALS
SYSTOLIC BLOOD PRESSURE: 127 MMHG | WEIGHT: 213 LBS | HEART RATE: 79 BPM | DIASTOLIC BLOOD PRESSURE: 73 MMHG | BODY MASS INDEX: 36.56 KG/M2 | TEMPERATURE: 97.7 F | OXYGEN SATURATION: 100 %

## 2024-04-26 DIAGNOSIS — R21 RASH: Primary | ICD-10-CM

## 2024-04-26 DIAGNOSIS — E11.29 TYPE 2 DIABETES MELLITUS WITH OTHER DIABETIC KIDNEY COMPLICATION (HCC): ICD-10-CM

## 2024-04-26 DIAGNOSIS — I10 ESSENTIAL (PRIMARY) HYPERTENSION: ICD-10-CM

## 2024-04-26 LAB — GLUCOSE, POC: 206 MG/DL

## 2024-04-26 PROCEDURE — 3074F SYST BP LT 130 MM HG: CPT | Performed by: FAMILY MEDICINE

## 2024-04-26 PROCEDURE — 82962 GLUCOSE BLOOD TEST: CPT | Performed by: FAMILY MEDICINE

## 2024-04-26 PROCEDURE — 3078F DIAST BP <80 MM HG: CPT | Performed by: FAMILY MEDICINE

## 2024-04-26 PROCEDURE — 99214 OFFICE O/P EST MOD 30 MIN: CPT | Performed by: FAMILY MEDICINE

## 2024-04-26 RX ORDER — PREDNISONE 50 MG/1
50 TABLET ORAL DAILY
Qty: 5 TABLET | Refills: 0 | Status: SHIPPED | OUTPATIENT
Start: 2024-04-26 | End: 2024-05-01

## 2024-04-26 SDOH — ECONOMIC STABILITY: FOOD INSECURITY: WITHIN THE PAST 12 MONTHS, YOU WORRIED THAT YOUR FOOD WOULD RUN OUT BEFORE YOU GOT MONEY TO BUY MORE.: NEVER TRUE

## 2024-04-26 SDOH — ECONOMIC STABILITY: FOOD INSECURITY: WITHIN THE PAST 12 MONTHS, THE FOOD YOU BOUGHT JUST DIDN'T LAST AND YOU DIDN'T HAVE MONEY TO GET MORE.: NEVER TRUE

## 2024-04-26 SDOH — ECONOMIC STABILITY: INCOME INSECURITY: HOW HARD IS IT FOR YOU TO PAY FOR THE VERY BASICS LIKE FOOD, HOUSING, MEDICAL CARE, AND HEATING?: NOT HARD AT ALL

## 2024-04-26 ASSESSMENT — PATIENT HEALTH QUESTIONNAIRE - PHQ9
SUM OF ALL RESPONSES TO PHQ QUESTIONS 1-9: 0
SUM OF ALL RESPONSES TO PHQ9 QUESTIONS 1 & 2: 0
SUM OF ALL RESPONSES TO PHQ QUESTIONS 1-9: 0
1. LITTLE INTEREST OR PLEASURE IN DOING THINGS: NOT AT ALL
2. FEELING DOWN, DEPRESSED OR HOPELESS: NOT AT ALL

## 2024-04-26 NOTE — PROGRESS NOTES
Visit Reason: HTN and DM    Fasting glucose, only had tea with stevia this morning  Results for orders placed or performed in visit on 04/26/24   AMB POC GLUCOSE BLOOD, BY GLUCOSE MONITORING DEVICE   Result Value Ref Range    Glucose,  MG/DL       \"Have you been to the ER, urgent care clinic since your last visit?  Hospitalized since your last visit?\"    NO    “Have you seen or consulted any other health care providers outside of [facility] since your last visit?”    NO     “Have you had a pap smear?”    NO    Date of last Cervical Cancer screen (HPV or PAP): 12/20/2016         “Have you had a colorectal cancer screening such as a colonoscopy/FIT/Cologuard?    NO    No colonoscopy on file  No cologuard on file  Date of last FIT: 1/19/2021   No flexible sigmoidoscopy on file         Click Here for Release of Records Request

## 2024-04-26 NOTE — PROGRESS NOTES
Aziza Galeano seen at d/c, full name and  verified, given After visit Summary and reviewed today's visit with patient along with instructions on when it is recommended to come back.  I reviewed the medication list with the patient to ensure she knows how to and when to take her medications.  Good Rx coupons given/texted to patient as well as instructions on how to use at the pharmacy.  Patient was advised that she will be contacted by a [facility] Coordinator in regards to her Dermatology referral. Patient was notified that [facility] has its own financial screening.  I have reviewed the provider's instructions with the patient, answering all questions to her satisfaction. Patient verbalized understanding.  Anay Davey RN

## 2024-04-26 NOTE — PROGRESS NOTES
Aziza Galeano (: 1960) is a 63 y.o. female, Established patient, here for evaluation of the following chief complaint(s):  Hypertension (Follow up, restarted her metoprolol a week ago), Diabetes (Follow up), and Psoriasis (Follow up, has been spreading )       ASSESSMENT/PLAN:  1. Rash  Not responding to topical and worsening.  Suspect psoriasis.  Will refer to Derm and try a short course of Prednisone.  -     External Referral To Dermatology  2. Type 2 diabetes mellitus with other diabetic kidney complication (HCC)  Reviewed strict diet while on Prednisone.  If uncontrolled BG > 400 we could use insulin temporarily.  -     AMB POC GLUCOSE BLOOD, BY GLUCOSE MONITORING DEVICE  3. Essential (primary) hypertension  Controlled, continue current regimen.    Return for follow up F2F in 2 weeks for rash (chronic care slot OK).    SUBJECTIVE:  Follow up for DM, HTN  Rash:  She stopped metoprolol and started steroid creams.  Improved temporarily but has flared and continues so spread up thigh, arms, scalp.  1 month ago started with pruritic papules on legs.  Then developed rash spread to arms and scalp.  Had red area on Lt Lower abdomen x 2 months.  DM: Patient is taking Amaryl 4 mg and Metformin 1000mg BID regularly.   Her diet varies.  Denies hypoglycemic spells. Not checking BG.  Rybelsus PAP denied.  HTN:  Patient is taking Lotrel, Metoprolol BID.  She resumed Metoprolol because her rash did not resolve.    Review of Systems   Constitutional:  Negative for chills, fatigue, fever and unexpected weight change.     OBJECTIVE:  Blood pressure 127/73, pulse 79, temperature 97.7 °F (36.5 °C), temperature source Temporal, weight 96.6 kg (213 lb), SpO2 100 %.  Physical Exam  CONSTITUTIONAL:  Well developed.  No apparent distress.  PSYCHIATRIC: Oriented to time, place, person & situation.  Appropriate mood and affect.  SKIN:  scalp within hair line with papular, flaking rash.  Face with moist, mildly erythematous areas

## 2024-05-06 RX ORDER — METOPROLOL SUCCINATE 50 MG/1
50 TABLET, EXTENDED RELEASE ORAL DAILY
Qty: 90 TABLET | Refills: 0 | OUTPATIENT
Start: 2024-05-06

## 2024-05-08 ENCOUNTER — OFFICE VISIT (OUTPATIENT)
Age: 64
End: 2024-05-08

## 2024-05-08 DIAGNOSIS — Z71.89 COUNSELING AND COORDINATION OF CARE: Primary | ICD-10-CM

## 2024-05-08 SDOH — ECONOMIC STABILITY: INCOME INSECURITY: IN THE LAST 12 MONTHS, WAS THERE A TIME WHEN YOU WERE NOT ABLE TO PAY THE MORTGAGE OR RENT ON TIME?: NO

## 2024-05-08 SDOH — ECONOMIC STABILITY: TRANSPORTATION INSECURITY
IN THE PAST 12 MONTHS, HAS THE LACK OF TRANSPORTATION KEPT YOU FROM MEDICAL APPOINTMENTS OR FROM GETTING MEDICATIONS?: NO

## 2024-05-08 SDOH — ECONOMIC STABILITY: TRANSPORTATION INSECURITY
IN THE PAST 12 MONTHS, HAS LACK OF TRANSPORTATION KEPT YOU FROM MEETINGS, WORK, OR FROM GETTING THINGS NEEDED FOR DAILY LIVING?: NO

## 2024-05-08 NOTE — PROGRESS NOTES
[facility] financial screening is complete. Patient has been instructed to call AN appointment line on or after 5/22/24.

## 2024-05-09 ENCOUNTER — OFFICE VISIT (OUTPATIENT)
Age: 64
End: 2024-05-09

## 2024-05-09 VITALS
DIASTOLIC BLOOD PRESSURE: 75 MMHG | BODY MASS INDEX: 35.05 KG/M2 | WEIGHT: 204.2 LBS | SYSTOLIC BLOOD PRESSURE: 120 MMHG | HEART RATE: 79 BPM | TEMPERATURE: 97.7 F | OXYGEN SATURATION: 97 %

## 2024-05-09 DIAGNOSIS — L40.9 PSORIASIS: Primary | ICD-10-CM

## 2024-05-09 PROCEDURE — 3074F SYST BP LT 130 MM HG: CPT | Performed by: FAMILY MEDICINE

## 2024-05-09 PROCEDURE — 3078F DIAST BP <80 MM HG: CPT | Performed by: FAMILY MEDICINE

## 2024-05-09 PROCEDURE — 99213 OFFICE O/P EST LOW 20 MIN: CPT | Performed by: FAMILY MEDICINE

## 2024-05-09 RX ORDER — HYDROXYZINE HYDROCHLORIDE 25 MG/1
25 TABLET, FILM COATED ORAL NIGHTLY
Qty: 30 TABLET | Refills: 0 | Status: SHIPPED | OUTPATIENT
Start: 2024-05-09 | End: 2024-06-08

## 2024-05-09 RX ORDER — CALCIPOTRIENE 50 UG/G
CREAM TOPICAL
Qty: 60 G | Refills: 4 | Status: SHIPPED | OUTPATIENT
Start: 2024-05-09

## 2024-05-09 RX ORDER — METOPROLOL SUCCINATE 50 MG/1
50 TABLET, EXTENDED RELEASE ORAL DAILY
Qty: 90 TABLET | Refills: 1 | Status: SHIPPED | OUTPATIENT
Start: 2024-05-09

## 2024-05-09 SDOH — SOCIAL STABILITY: SOCIAL NETWORK
DO YOU BELONG TO ANY CLUBS OR ORGANIZATIONS SUCH AS CHURCH GROUPS UNIONS, FRATERNAL OR ATHLETIC GROUPS, OR SCHOOL GROUPS?: YES

## 2024-05-09 SDOH — HEALTH STABILITY: PHYSICAL HEALTH: ON AVERAGE, HOW MANY DAYS PER WEEK DO YOU ENGAGE IN MODERATE TO STRENUOUS EXERCISE (LIKE A BRISK WALK)?: 3 DAYS

## 2024-05-09 SDOH — HEALTH STABILITY: MENTAL HEALTH
STRESS IS WHEN SOMEONE FEELS TENSE, NERVOUS, ANXIOUS, OR CAN'T SLEEP AT NIGHT BECAUSE THEIR MIND IS TROUBLED. HOW STRESSED ARE YOU?: NOT AT ALL

## 2024-05-09 SDOH — SOCIAL STABILITY: SOCIAL NETWORK: HOW OFTEN DO YOU GET TOGETHER WITH FRIENDS OR RELATIVES?: THREE TIMES A WEEK

## 2024-05-09 SDOH — HEALTH STABILITY: MENTAL HEALTH: HOW OFTEN DO YOU HAVE A DRINK CONTAINING ALCOHOL?: NEVER

## 2024-05-09 SDOH — SOCIAL STABILITY: SOCIAL NETWORK: ARE YOU MARRIED, WIDOWED, DIVORCED, SEPARATED, NEVER MARRIED, OR LIVING WITH A PARTNER?: SEPARATED

## 2024-05-09 SDOH — SOCIAL STABILITY: SOCIAL NETWORK: HOW OFTEN DO YOU ATTENT MEETINGS OF THE CLUB OR ORGANIZATION YOU BELONG TO?: MORE THAN 4 TIMES PER YEAR

## 2024-05-09 SDOH — HEALTH STABILITY: PHYSICAL HEALTH: ON AVERAGE, HOW MANY MINUTES DO YOU ENGAGE IN EXERCISE AT THIS LEVEL?: 30 MIN

## 2024-05-09 SDOH — SOCIAL STABILITY: SOCIAL NETWORK: HOW OFTEN DO YOU ATTEND CHURCH OR RELIGIOUS SERVICES?: MORE THAN 4 TIMES PER YEAR

## 2024-05-09 SDOH — SOCIAL STABILITY: SOCIAL NETWORK: IN A TYPICAL WEEK, HOW MANY TIMES DO YOU TALK ON THE PHONE WITH FAMILY, FRIENDS, OR NEIGHBORS?: THREE TIMES A WEEK

## 2024-05-09 ASSESSMENT — PATIENT HEALTH QUESTIONNAIRE - PHQ9
SUM OF ALL RESPONSES TO PHQ QUESTIONS 1-9: 0
1. LITTLE INTEREST OR PLEASURE IN DOING THINGS: NOT AT ALL
SUM OF ALL RESPONSES TO PHQ QUESTIONS 1-9: 0

## 2024-05-09 NOTE — PROGRESS NOTES
\"Have you been to the ER, urgent care clinic since your last visit?  Hospitalized since your last visit?\"    NO    “Have you seen or consulted any other health care providers outside of [facility] since your last visit?”    NO     “Have you had a pap smear?”    NO    Date of last Cervical Cancer screen (HPV or PAP): 12/20/2016         “Have you had a colorectal cancer screening such as a colonoscopy/FIT/Cologuard?    NO    No colonoscopy on file  No cologuard on file  Date of last FIT: 1/19/2021   No flexible sigmoidoscopy on file         Click Here for Release of Records Request

## 2024-05-09 NOTE — PROGRESS NOTES
Aziza Galeano seen at d/c, full name and  verified. After Visit Summary provided and all discharge instructions reviewed.  No follow up appointment listed on provider notes. Medication eRxd. Medication list and possible side effects reviewed. Teach back method utilized to ensure patient accurately understands how to and when to take each medication. Opportunity for questions provided, patient denies any questions. Jessica Fierro RN

## 2024-05-09 NOTE — PROGRESS NOTES
Aziza Galeano (: 1960) is a 63 y.o. female, Established patient, here for evaluation of the following chief complaint(s):  Follow-up (Pt comes in for rash fu)       ASSESSMENT/PLAN:  1. Psoriasis  She has AN approved but is waiting for Derm.  Add Dovonex to steroid creams, atarax for sleep.  Discussed OTC measures.    No follow-ups on file.    SUBJECTIVE:  Follow up on rash  Rash/Psoriasis:  Improved temporarily on Prednisone but has continued.  Gets white flaking/scales on patches on abdomen.  She is using OTC moisturizing creams.  Pruritus is not letting her sleep.  1 month ago started with pruritic papules on legs.  Then developed rash spread to arms and scalp.  Had red area on Lt Lower abdomen x 2 months.    Review of Systems  Denies fevers  OBJECTIVE:  Blood pressure 120/75, pulse 79, temperature 97.7 °F (36.5 °C), temperature source Temporal, weight 92.6 kg (204 lb 3.2 oz), SpO2 97 %.  Physical Exam  CONSTITUTIONAL:  Well developed.  No apparent distress.  PSYCHIATRIC: Oriented to time, place, person & situation.  Appropriate mood and affect.  SKIN:  scalp within hair line with papular, flaking rash.  Face with moist, mildly erythematous areas around eyes, nasal folds.  On forearms and shoulders has lichenoid papules.  Abdomen with coalescing papular rash with erythematous base. B legs with round papules with central dry/flaking much less than last visit.  Flaking plaques extending up on thighs B.      No results found for any visits on 24.       An electronic signature was used to authenticate this note.  -- Henry Todd MD

## 2024-05-22 ENCOUNTER — TELEPHONE (OUTPATIENT)
Age: 64
End: 2024-05-22

## 2024-05-22 NOTE — TELEPHONE ENCOUNTER
Patient Called on 5/22/20204.  Patient verbalized not feeling good and thinking her leg might be infected.  Patient was offered an  appointment with Dr Todd tomorrow 8:30 due to an open slot   cancellation. Patient verbalized not being able to take it due to [place] until [place].  Patient was told  a message will be sent to one of our nurses.  Patient was advised to go to an Urgent Care close to her.

## 2024-05-22 NOTE — TELEPHONE ENCOUNTER
Tc to the pt for triage. She has atilio edema in LE. It started over the weekend. The pt can not walk well. Pain is 0/10 right now, has pain when she has pressure on them. 6/10 pain. The pt denied any drainage. She feels like there is fluid underneath her skin. The pt feels like she has an infection in her legs. The pt was asked about redness or warmth and toenail color. She denied any of these sxs. The pt was given reason for the need to go to the ED. The pt has hx of HTN, and DM. The pt was advised to go the ED today. She is in [place] Right now and will not be home until Fri. Then she stated they may come home tomorrow night or today. I informed the pt to go to the urgent care or ED where she is today. Cyndi Ruiz RN

## 2024-05-24 ENCOUNTER — OFFICE VISIT (OUTPATIENT)
Age: 64
End: 2024-05-24

## 2024-05-24 ENCOUNTER — HOSPITAL ENCOUNTER (OUTPATIENT)
Dept: VASCULAR SURGERY | Facility: HOSPITAL | Age: 64
Discharge: HOME | End: 2024-05-24
Attending: FAMILY MEDICINE

## 2024-05-24 VITALS
WEIGHT: 217 LBS | HEART RATE: 83 BPM | TEMPERATURE: 98.1 F | DIASTOLIC BLOOD PRESSURE: 66 MMHG | BODY MASS INDEX: 37.25 KG/M2 | SYSTOLIC BLOOD PRESSURE: 129 MMHG | OXYGEN SATURATION: 95 %

## 2024-05-24 DIAGNOSIS — E11.29 TYPE 2 DIABETES MELLITUS WITH OTHER DIABETIC KIDNEY COMPLICATION (HCC): ICD-10-CM

## 2024-05-24 DIAGNOSIS — L40.9 PSORIASIS: Primary | ICD-10-CM

## 2024-05-24 DIAGNOSIS — M79.89 LEG SWELLING: ICD-10-CM

## 2024-05-24 LAB — GLUCOSE, POC: NORMAL MG/DL

## 2024-05-24 PROCEDURE — 93970 EXTREMITY STUDY: CPT

## 2024-05-24 PROCEDURE — 99214 OFFICE O/P EST MOD 30 MIN: CPT | Performed by: FAMILY MEDICINE

## 2024-05-24 PROCEDURE — 82962 GLUCOSE BLOOD TEST: CPT | Performed by: FAMILY MEDICINE

## 2024-05-24 PROCEDURE — 3074F SYST BP LT 130 MM HG: CPT | Performed by: FAMILY MEDICINE

## 2024-05-24 PROCEDURE — 3078F DIAST BP <80 MM HG: CPT | Performed by: FAMILY MEDICINE

## 2024-05-24 RX ORDER — CEPHALEXIN 500 MG/1
500 CAPSULE ORAL 3 TIMES DAILY
Qty: 15 CAPSULE | Refills: 0 | Status: SHIPPED | OUTPATIENT
Start: 2024-05-24 | End: 2024-05-29

## 2024-05-24 RX ORDER — CALCIPOTRIENE 50 UG/G
CREAM TOPICAL
Qty: 60 G | Refills: 4 | Status: SHIPPED | OUTPATIENT
Start: 2024-05-24

## 2024-05-24 RX ORDER — BETAMETHASONE DIPROPIONATE 0.05 %
OINTMENT (GRAM) TOPICAL
Qty: 45 G | Refills: 2 | Status: SHIPPED | OUTPATIENT
Start: 2024-05-24

## 2024-05-24 RX ORDER — BETAMETHASONE DIPROPIONATE 0.05 %
OINTMENT (GRAM) TOPICAL
Qty: 45 G | Refills: 2 | Status: SHIPPED | OUTPATIENT
Start: 2024-05-24 | End: 2024-05-24 | Stop reason: SDUPTHER

## 2024-05-24 ASSESSMENT — PATIENT HEALTH QUESTIONNAIRE - PHQ9
SUM OF ALL RESPONSES TO PHQ9 QUESTIONS 1 & 2: 0
SUM OF ALL RESPONSES TO PHQ QUESTIONS 1-9: 0
SUM OF ALL RESPONSES TO PHQ QUESTIONS 1-9: 0
2. FEELING DOWN, DEPRESSED OR HOPELESS: NOT AT ALL
SUM OF ALL RESPONSES TO PHQ QUESTIONS 1-9: 0
1. LITTLE INTEREST OR PLEASURE IN DOING THINGS: NOT AT ALL
SUM OF ALL RESPONSES TO PHQ QUESTIONS 1-9: 0

## 2024-05-24 ASSESSMENT — ENCOUNTER SYMPTOMS
SHORTNESS OF BREATH: 0
COUGH: 0

## 2024-05-24 NOTE — PROGRESS NOTES
Aziza Galeano (: 1960) is a 63 y.o. female, Established patient, here for evaluation of the following chief complaint(s):  Skin Problem (Swelling foot follow up)       ASSESSMENT/PLAN:  1. Psoriasis  Generalized and currently only on topical tx.  Leg swelling shows significant inflammation and so recommended restarting topical steroid.  Since BBlocker is a common trigger for psoriasis I asked her to hold this medication which she had restarted.  Awaiting Dermatology for further evaluation and systemic tx.  2. Leg swelling  Ddx include DVT, cellulitis, or inflammation from psoriasis.  Will cover with Keflex and get LE duplex.  Start steroid cream.  -     Vascular duplex lower extremity venous bilateral; Future  3. Type 2 diabetes mellitus with other diabetic kidney complication (HCC)  -     AMB POC GLUCOSE BLOOD, BY GLUCOSE MONITORING DEVICE    Return for follow up F2F in 1 week for rash and swelling.    SUBJECTIVE:  Follow up on psoriasis  Psoriasis:  Improved temporarily on Prednisone and being off Metoprolol.  Gets white flaking/scales on patches all over: scalp is very pruritic, affects entire torso.  Started Dovonex but is not using steroid cream.  Is using OTC creams.  Has noted new redness and swelling in her leg with pealing.    Rash started on lower abdomen 2023 and then pruritic papules/plaques on legs 2024.  It spread to arms and scalp 3/2024.  Improved temporarily with Prednisone.  Has appt with [facility] Dermatology .    CAD:  NSTEMI 2023.  Pt is taking ASA, Metoprolol, Lipitor.  Patient is able to walk a flight of stairs without any chest pain or PEREZ.  Cath 7/10/2023: diffuse CAD with 70% branch vessel.  Medical tx recommended.  HTN:  Patient is taking Lotrel, Metoprolol BID but frequently misses evening dose.    Review of Systems   Constitutional:  Negative for chills and fever.   Respiratory:  Negative for cough and shortness of breath.      OBJECTIVE:  Blood pressure 129/66, pulse

## 2024-05-24 NOTE — PROGRESS NOTES
Results for orders placed or performed in visit on 05/24/24   AMB POC GLUCOSE BLOOD, BY GLUCOSE MONITORING DEVICE   Result Value Ref Range    Glucose, POC 190f MG/DL     Coordination of Care  1. Have you been to the ER, urgent care clinic since your last visit?  Hospitalized since your last visit? no    2. Have you seen or consulted any other health care providers outside of the [facility] since your last visit?  Include any pap smears or colon screening. no    Does the patient need refills? yes    Learning Assessment Complete? yes  Depression Screening complete in the past 12 months? yes

## 2024-05-28 NOTE — RESULT ENCOUNTER NOTE
Spoke with patient and reviewed results: no DVT.  Leg swelling and redness has improved with cream, elevation, and abx.  She has follow up with dermatology next week.

## 2024-06-06 ENCOUNTER — OFFICE VISIT (OUTPATIENT)
Age: 64
End: 2024-06-06

## 2024-06-06 VITALS
DIASTOLIC BLOOD PRESSURE: 74 MMHG | BODY MASS INDEX: 35.81 KG/M2 | SYSTOLIC BLOOD PRESSURE: 114 MMHG | OXYGEN SATURATION: 98 % | TEMPERATURE: 97.7 F | HEART RATE: 82 BPM | WEIGHT: 208.6 LBS

## 2024-06-06 DIAGNOSIS — L40.9 PSORIASIS: Primary | ICD-10-CM

## 2024-06-06 DIAGNOSIS — M79.89 LEG SWELLING: ICD-10-CM

## 2024-06-06 PROCEDURE — 99213 OFFICE O/P EST LOW 20 MIN: CPT | Performed by: FAMILY MEDICINE

## 2024-06-06 PROCEDURE — 3078F DIAST BP <80 MM HG: CPT | Performed by: FAMILY MEDICINE

## 2024-06-06 PROCEDURE — 3074F SYST BP LT 130 MM HG: CPT | Performed by: FAMILY MEDICINE

## 2024-06-06 ASSESSMENT — PATIENT HEALTH QUESTIONNAIRE - PHQ9
SUM OF ALL RESPONSES TO PHQ QUESTIONS 1-9: 0
SUM OF ALL RESPONSES TO PHQ QUESTIONS 1-9: 0
2. FEELING DOWN, DEPRESSED OR HOPELESS: NOT AT ALL
1. LITTLE INTEREST OR PLEASURE IN DOING THINGS: NOT AT ALL
SUM OF ALL RESPONSES TO PHQ QUESTIONS 1-9: 0
SUM OF ALL RESPONSES TO PHQ9 QUESTIONS 1 & 2: 0
SUM OF ALL RESPONSES TO PHQ QUESTIONS 1-9: 0

## 2024-06-06 NOTE — PROGRESS NOTES
Chief Complaint   Patient presents with    Rash     Follow up     Swelling     Follow up          \"Have you been to the ER, urgent care clinic since your last visit?  Hospitalized since your last visit?\"    NO    “Have you seen or consulted any other health care providers outside of [facility] since your last visit?”    NO     “Have you had a pap smear?”    NO    Date of last Cervical Cancer screen (HPV or PAP): 12/20/2016         “Have you had a colorectal cancer screening such as a colonoscopy/FIT/Cologuard?    NO    No colonoscopy on file  No cologuard on file  Date of last FIT: 1/19/2021   No flexible sigmoidoscopy on file         Click Here for Release of Records Request

## 2024-06-06 NOTE — PROGRESS NOTES
Aziza Galeano (: 1960) is a 63 y.o. female, Established patient, here for evaluation of the following chief complaint(s):  Rash (Follow up ) and Swelling (Follow up )       ASSESSMENT/PLAN:  1. Psoriasis  Follow up with Derm  2. Leg swelling  Improved.    Return for follow up F2F in 4 weeks for HTN, psoriasis.    SUBJECTIVE:  Follow up for swelling and rash  Leg Swelling: leg swelling improved with Diprolene ointment on legs and antibiotic.  LE Doppler 2024 was negative for DVT.  Psoriasis:  Continues with generalized rash, sees Dermatology later today.  Improved only mildly slightly on Prednisone and being off Metoprolol.  Has held Metoprolol since last visit.    Rash started on lower abdomen 2023 and then pruritic papules/plaques on legs 2024.  It spread to arms and scalp 3/2024.    Review of Systems    OBJECTIVE:  Blood pressure 114/74, pulse 82, temperature 97.7 °F (36.5 °C), temperature source Temporal, weight 94.6 kg (208 lb 9.6 oz), SpO2 98 %.  Physical Exam  CONSTITUTIONAL:  Well developed.  No apparent distress.  PSYCHIATRIC: Oriented to time, place, person & situation.  Appropriate mood and affect.  CARDIOVASCULAR:  Regular rate and rhythm.  Normal S1, S2.  No extra sounds.  RESPIRATORY:  Normal effort.  Normal ascultation without wheezing.  EXTREMITIES:  No edema.  SKIN:  scalp within hair line with papular, flaking rash.  Face with moist, mildly erythematous areas around eyes, nasal folds.  On forearms and shoulders has scattered plaques.  Abdomen with coalescing papular rash with erythematous base. B legs with circular rash with erythematous edges and central darkening that coalesce into large patches of flaking.  EXTREMITIES:  No edema.    No results found for any visits on 24.       An electronic signature was used to authenticate this note.  -- Henry Todd MD      O-Z Plasty Text: The defect edges were debeveled with a #15 scalpel blade.  Given the location of the defect, shape of the defect and the proximity to free margins an O-Z plasty (double transposition flap) was deemed most appropriate.  Using a sterile surgical marker, the appropriate transposition flaps were drawn incorporating the defect and placing the expected incisions within the relaxed skin tension lines where possible.    The area thus outlined was incised deep to adipose tissue with a #15 scalpel blade.  The skin margins were undermined to an appropriate distance in all directions utilizing iris scissors.  Hemostasis was achieved with electrocautery.  The flaps were then transposed into place, one clockwise and the other counterclockwise, and anchored with interrupted buried subcutaneous sutures.

## 2024-06-06 NOTE — PROGRESS NOTES
Chief Complaint   Patient presents with    Rash     Follow up     Swelling     Follow up      Patient name and date of birth verified.  Patient given an after visit summary.   Advised to schedule next appointment before leaving clinic office.  Patient verbalized understanding of all information given at time of visit. Caroline Canales LPN

## 2024-06-19 NOTE — TELEPHONE ENCOUNTER
Chief Complaint   Patient presents with    Medication Refill     betamethasone dipropionate 0.05 % ointment     Prescription sent on 5/24/24 with 2 refills.     Received fax for hydroxyzine 25 mg tablet. Patient notes she is not using the hydroxyzine 25 mg tablet.    Patient last seen on 6/06/2024.  Caroline Canales LPN

## 2024-06-20 RX ORDER — BETAMETHASONE DIPROPIONATE 0.5 MG/G
CREAM TOPICAL
Qty: 50 G | Refills: 1 | Status: SHIPPED | OUTPATIENT
Start: 2024-06-20

## 2024-06-20 RX ORDER — HYDROXYZINE HYDROCHLORIDE 25 MG/1
25 TABLET, FILM COATED ORAL NIGHTLY
Qty: 30 TABLET | Refills: 0 | Status: SHIPPED | OUTPATIENT
Start: 2024-06-20 | End: 2024-07-20

## 2024-07-05 ENCOUNTER — OFFICE VISIT (OUTPATIENT)
Age: 64
End: 2024-07-05

## 2024-07-05 VITALS
TEMPERATURE: 99.1 F | BODY MASS INDEX: 35.36 KG/M2 | SYSTOLIC BLOOD PRESSURE: 152 MMHG | HEART RATE: 80 BPM | OXYGEN SATURATION: 96 % | DIASTOLIC BLOOD PRESSURE: 91 MMHG | WEIGHT: 206 LBS

## 2024-07-05 DIAGNOSIS — I10 ESSENTIAL (PRIMARY) HYPERTENSION: ICD-10-CM

## 2024-07-05 DIAGNOSIS — E11.29 TYPE 2 DIABETES MELLITUS WITH OTHER DIABETIC KIDNEY COMPLICATION (HCC): ICD-10-CM

## 2024-07-05 DIAGNOSIS — L40.9 PSORIASIS: Primary | ICD-10-CM

## 2024-07-05 LAB — GLUCOSE, POC: 192 MG/DL

## 2024-07-05 PROCEDURE — 3079F DIAST BP 80-89 MM HG: CPT | Performed by: FAMILY MEDICINE

## 2024-07-05 PROCEDURE — 82962 GLUCOSE BLOOD TEST: CPT | Performed by: FAMILY MEDICINE

## 2024-07-05 PROCEDURE — 99213 OFFICE O/P EST LOW 20 MIN: CPT | Performed by: FAMILY MEDICINE

## 2024-07-05 PROCEDURE — 3077F SYST BP >= 140 MM HG: CPT | Performed by: FAMILY MEDICINE

## 2024-07-05 RX ORDER — DESONIDE 0.5 MG/G
OINTMENT TOPICAL
COMMUNITY
Start: 2024-06-18

## 2024-07-05 RX ORDER — TRIAMCINOLONE ACETONIDE 1 MG/G
OINTMENT TOPICAL
COMMUNITY
Start: 2024-06-17

## 2024-07-05 RX ORDER — FLUOCINOLONE ACETONIDE 0.11 MG/ML
OIL TOPICAL
COMMUNITY
Start: 2024-06-17

## 2024-07-05 RX ORDER — GLIMEPIRIDE 4 MG/1
4 TABLET ORAL
Qty: 90 TABLET | Refills: 1 | Status: SHIPPED | OUTPATIENT
Start: 2024-07-05

## 2024-07-05 SDOH — ECONOMIC STABILITY: FOOD INSECURITY: WITHIN THE PAST 12 MONTHS, THE FOOD YOU BOUGHT JUST DIDN'T LAST AND YOU DIDN'T HAVE MONEY TO GET MORE.: NEVER TRUE

## 2024-07-05 SDOH — ECONOMIC STABILITY: FOOD INSECURITY: WITHIN THE PAST 12 MONTHS, YOU WORRIED THAT YOUR FOOD WOULD RUN OUT BEFORE YOU GOT MONEY TO BUY MORE.: NEVER TRUE

## 2024-07-05 SDOH — ECONOMIC STABILITY: INCOME INSECURITY: HOW HARD IS IT FOR YOU TO PAY FOR THE VERY BASICS LIKE FOOD, HOUSING, MEDICAL CARE, AND HEATING?: NOT HARD AT ALL

## 2024-07-05 ASSESSMENT — ENCOUNTER SYMPTOMS
SHORTNESS OF BREATH: 0
CHEST TIGHTNESS: 0
COUGH: 0

## 2024-07-05 ASSESSMENT — PATIENT HEALTH QUESTIONNAIRE - PHQ9
SUM OF ALL RESPONSES TO PHQ9 QUESTIONS 1 & 2: 0
SUM OF ALL RESPONSES TO PHQ QUESTIONS 1-9: 0
2. FEELING DOWN, DEPRESSED OR HOPELESS: NOT AT ALL
1. LITTLE INTEREST OR PLEASURE IN DOING THINGS: NOT AT ALL
SUM OF ALL RESPONSES TO PHQ QUESTIONS 1-9: 0

## 2024-07-05 NOTE — PROGRESS NOTES
Pt's name and  verified. AVS provided. Medication reviewed and education provided. Good Rx coupons given to patient as well as instructions on how to use at the pharmacy. Patient advise to make a follow up visit for 6 week per provider. Time allowed for questions, no questions at this time. Kassie Valiente LPN   
\"Have you been to the ER, urgent care clinic since your last visit?  Hospitalized since your last visit?\"    NO    “Have you seen or consulted any other health care providers outside of [facility] since your last visit?”    NO     “Have you had a pap smear?”    NO    Date of last Cervical Cancer screen (HPV or PAP): 12/20/2016         “Have you had a colorectal cancer screening such as a colonoscopy/FIT/Cologuard?    NO  Results for orders placed or performed in visit on 07/05/24   AMB POC GLUCOSE BLOOD, BY GLUCOSE MONITORING DEVICE   Result Value Ref Range    Glucose,  MG/DL      No colonoscopy on file  No cologuard on file  Date of last FIT: 1/19/2021   No flexible sigmoidoscopy on file         Click Here for Release of Records Request    
areas around eyes, nasal folds.  On forearms and shoulders has scattered plaques.  Abdomen with coalescing papular rash with erythematous base. B legs with circular rash with erythematous edges and central darkening that coalesce into large patches on anterior legs.  EXTREMITIES:  No edema.    Results for orders placed or performed in visit on 07/05/24   AMB POC GLUCOSE BLOOD, BY GLUCOSE MONITORING DEVICE   Result Value Ref Range    Glucose,  MG/DL          An electronic signature was used to authenticate this note.  -- Henry Todd MD

## 2024-08-13 RX ORDER — ERGOCALCIFEROL 1.25 MG/1
50000 CAPSULE, LIQUID FILLED ORAL WEEKLY
Qty: 12 CAPSULE | Refills: 0 | OUTPATIENT
Start: 2024-08-13

## 2024-08-16 ENCOUNTER — HOSPITAL ENCOUNTER (OUTPATIENT)
Facility: HOSPITAL | Age: 64
Setting detail: SPECIMEN
Discharge: HOME | End: 2024-08-19

## 2024-08-16 ENCOUNTER — OFFICE VISIT (OUTPATIENT)
Age: 64
End: 2024-08-16

## 2024-08-16 VITALS
SYSTOLIC BLOOD PRESSURE: 142 MMHG | HEART RATE: 87 BPM | TEMPERATURE: 98.4 F | DIASTOLIC BLOOD PRESSURE: 80 MMHG | OXYGEN SATURATION: 96 %

## 2024-08-16 DIAGNOSIS — E11.29 TYPE 2 DIABETES MELLITUS WITH OTHER DIABETIC KIDNEY COMPLICATION (HCC): ICD-10-CM

## 2024-08-16 DIAGNOSIS — I10 ESSENTIAL (PRIMARY) HYPERTENSION: Primary | ICD-10-CM

## 2024-08-16 DIAGNOSIS — I25.10 CORONARY ARTERY DISEASE INVOLVING NATIVE CORONARY ARTERY OF NATIVE HEART WITHOUT ANGINA PECTORIS: ICD-10-CM

## 2024-08-16 PROCEDURE — 82043 UR ALBUMIN QUANTITATIVE: CPT

## 2024-08-16 PROCEDURE — 84443 ASSAY THYROID STIM HORMONE: CPT

## 2024-08-16 PROCEDURE — 80053 COMPREHEN METABOLIC PANEL: CPT

## 2024-08-16 PROCEDURE — 83036 HEMOGLOBIN GLYCOSYLATED A1C: CPT

## 2024-08-16 PROCEDURE — 82607 VITAMIN B-12: CPT

## 2024-08-16 PROCEDURE — 3077F SYST BP >= 140 MM HG: CPT | Performed by: FAMILY MEDICINE

## 2024-08-16 PROCEDURE — 82570 ASSAY OF URINE CREATININE: CPT

## 2024-08-16 PROCEDURE — 80061 LIPID PANEL: CPT

## 2024-08-16 PROCEDURE — 3079F DIAST BP 80-89 MM HG: CPT | Performed by: FAMILY MEDICINE

## 2024-08-16 PROCEDURE — 99214 OFFICE O/P EST MOD 30 MIN: CPT | Performed by: FAMILY MEDICINE

## 2024-08-16 RX ORDER — ERGOCALCIFEROL 1.25 MG/1
50000 CAPSULE ORAL WEEKLY
Qty: 12 CAPSULE | Refills: 1 | Status: SHIPPED | OUTPATIENT
Start: 2024-08-16

## 2024-08-16 ASSESSMENT — PATIENT HEALTH QUESTIONNAIRE - PHQ9
2. FEELING DOWN, DEPRESSED OR HOPELESS: NOT AT ALL
SUM OF ALL RESPONSES TO PHQ QUESTIONS 1-9: 0
SUM OF ALL RESPONSES TO PHQ9 QUESTIONS 1 & 2: 0
SUM OF ALL RESPONSES TO PHQ QUESTIONS 1-9: 0
1. LITTLE INTEREST OR PLEASURE IN DOING THINGS: NOT AT ALL

## 2024-08-16 ASSESSMENT — ENCOUNTER SYMPTOMS
CHEST TIGHTNESS: 0
COUGH: 0
SHORTNESS OF BREATH: 0

## 2024-08-16 NOTE — PROGRESS NOTES
\"Have you been to the ER, urgent care clinic since your last visit?  Hospitalized since your last visit?\"    NO    “Have you seen or consulted any other health care providers outside of [facility] since your last visit?”    NO     “Have you had a pap smear?”    NO    Date of last Cervical Cancer screen (HPV or PAP): 12/20/2016         “Have you had a colorectal cancer screening such as a colonoscopy/FIT/Cologuard?    NO    No colonoscopy on file  No cologuard on file  Date of last FIT: 1/19/2021   No flexible sigmoidoscopy on file         Click Here for Release of Records Request

## 2024-08-16 NOTE — PROGRESS NOTES
Aziza Galeano (: 1960) is a 63 y.o. female, Established patient, here for evaluation of the following chief complaint(s):  Follow-up (BP ) and Other (Pt c/o skin tags )       ASSESSMENT/PLAN:  1. Essential (primary) hypertension  Borderline, with CAD recommended restarting Toprol XL 25 mg QD (1/2 tab of 50mg)  2. Coronary artery disease involving native coronary artery of native heart without angina pectoris  Stable, restart Toprol XL 25 mg QD  3. Type 2 diabetes mellitus with other diabetic kidney complication (HCC)  Fair control, recheck baseline labs.  She has not been eligible for PAP medications.  Farxiga recently went generic and discussed this option with pt.  It is still $200/mo which pt feels it too much and so will continue to monitor price and start when patient is able to afford medication.  -     Comprehensive Metabolic Panel; Future  -     Hemoglobin A1C; Future  -     Lipid Panel; Future  -     TSH; Future  -     Vitamin B12; Future  -     Microalbumin / Creatinine Urine Ratio; Future    Return for follow up F2F in 3 months for DM, VV 1 week for results.    SUBJECTIVE:  Follow up for HTN  HTN:  Patient is taking Lotrel regularly.   CAD:  NSTEMI 2023.  Pt is taking ASA, Lipitor.  Patient is able to walk a flight of stairs without any chest pain or PEREZ.  Cath 7/10/2023: diffuse CAD with 70% branch vessel.  Medical tx recommended.  DM: Patient is taking Amaryl 4 mg and Metformin 1000mg BID regularly.   Her diet varies.  Denies hypoglycemic spells.  Not checking BG.  Rybelsus PAP denied.  Psoriasis:  Continues with generalized rash, saw Dermatology and confirmed Dx of psoriasis.  Is starting Skyrizi.  Rash started on lower abdomen 2023 and then pruritic papules/plaques on legs 2024.  It spread to arms and scalp 3/2024.    Review of Systems   Constitutional:  Negative for unexpected weight change.   Respiratory:  Negative for cough, chest tightness and shortness of breath.

## 2024-08-16 NOTE — PROGRESS NOTES
Chief Complaint   Patient presents with    Follow-up     BP     Other     Pt c/o skin tags      Patient name and date of birth verified.  Patient given an after visit summary, reviewed medication on how and when to take, coupon given to present to pharmacy for medication discount.  Advised to schedule next appointment before leaving clinic office.  Patient verbalized understanding of all information given at time of visit. Caroline Canales LPN

## 2024-08-17 LAB
ALBUMIN SERPL-MCNC: 4.1 G/DL (ref 3.5–5)
ALBUMIN/GLOB SERPL: 1.4 (ref 1.1–2.2)
ALP SERPL-CCNC: 153 U/L (ref 45–117)
ALT SERPL-CCNC: 32 U/L (ref 12–78)
ANION GAP SERPL CALC-SCNC: 7 MMOL/L (ref 5–15)
AST SERPL-CCNC: 27 U/L (ref 15–37)
BILIRUB SERPL-MCNC: 0.4 MG/DL (ref 0.2–1)
BUN SERPL-MCNC: 11 MG/DL (ref 6–20)
BUN/CREAT SERPL: 15 (ref 12–20)
CALCIUM SERPL-MCNC: 9.7 MG/DL (ref 8.5–10.1)
CHLORIDE SERPL-SCNC: 105 MMOL/L (ref 97–108)
CHOLEST SERPL-MCNC: 136 MG/DL
CO2 SERPL-SCNC: 29 MMOL/L (ref 21–32)
CREAT SERPL-MCNC: 0.73 MG/DL (ref 0.55–1.02)
CREAT UR-MCNC: 63.5 MG/DL
EST. AVERAGE GLUCOSE BLD GHB EST-MCNC: 174 MG/DL
GLOBULIN SER CALC-MCNC: 2.9 G/DL (ref 2–4)
GLUCOSE SERPL-MCNC: 163 MG/DL (ref 65–100)
HBA1C MFR BLD: 7.7 % (ref 4–5.6)
HDLC SERPL-MCNC: 53 MG/DL
HDLC SERPL: 2.6 (ref 0–5)
LDLC SERPL CALC-MCNC: 64.6 MG/DL (ref 0–100)
MICROALBUMIN UR-MCNC: 2.77 MG/DL
MICROALBUMIN/CREAT UR-RTO: 44 MG/G (ref 0–30)
POTASSIUM SERPL-SCNC: 3.8 MMOL/L (ref 3.5–5.1)
PROT SERPL-MCNC: 7 G/DL (ref 6.4–8.2)
SODIUM SERPL-SCNC: 141 MMOL/L (ref 136–145)
TRIGL SERPL-MCNC: 92 MG/DL
TSH SERPL DL<=0.05 MIU/L-ACNC: 1.67 UIU/ML (ref 0.36–3.74)
VIT B12 SERPL-MCNC: 441 PG/ML (ref 193–986)
VLDLC SERPL CALC-MCNC: 18.4 MG/DL

## 2024-09-03 ENCOUNTER — TELEMEDICINE (OUTPATIENT)
Age: 64
End: 2024-09-03

## 2024-09-03 DIAGNOSIS — E11.29 TYPE 2 DIABETES MELLITUS WITH OTHER DIABETIC KIDNEY COMPLICATION (HCC): Primary | ICD-10-CM

## 2024-09-03 DIAGNOSIS — I25.10 CORONARY ARTERY DISEASE INVOLVING NATIVE CORONARY ARTERY OF NATIVE HEART WITHOUT ANGINA PECTORIS: ICD-10-CM

## 2024-09-03 PROCEDURE — 99442 PR PHYS/QHP TELEPHONE EVALUATION 11-20 MIN: CPT | Performed by: FAMILY MEDICINE

## 2024-09-03 SDOH — ECONOMIC STABILITY: FOOD INSECURITY: WITHIN THE PAST 12 MONTHS, YOU WORRIED THAT YOUR FOOD WOULD RUN OUT BEFORE YOU GOT MONEY TO BUY MORE.: NEVER TRUE

## 2024-09-03 SDOH — ECONOMIC STABILITY: FOOD INSECURITY: WITHIN THE PAST 12 MONTHS, THE FOOD YOU BOUGHT JUST DIDN'T LAST AND YOU DIDN'T HAVE MONEY TO GET MORE.: NEVER TRUE

## 2024-09-03 SDOH — ECONOMIC STABILITY: INCOME INSECURITY: HOW HARD IS IT FOR YOU TO PAY FOR THE VERY BASICS LIKE FOOD, HOUSING, MEDICAL CARE, AND HEATING?: NOT HARD AT ALL

## 2024-09-03 ASSESSMENT — PATIENT HEALTH QUESTIONNAIRE - PHQ9
1. LITTLE INTEREST OR PLEASURE IN DOING THINGS: NOT AT ALL
SUM OF ALL RESPONSES TO PHQ QUESTIONS 1-9: 0
2. FEELING DOWN, DEPRESSED OR HOPELESS: NOT AT ALL
SUM OF ALL RESPONSES TO PHQ QUESTIONS 1-9: 0
SUM OF ALL RESPONSES TO PHQ9 QUESTIONS 1 & 2: 0

## 2024-09-03 NOTE — PROGRESS NOTES
0600- EDC - 24 EGA - 39/3 Pt arrived to labor and delivery for IOL- [place]. Pt placed in room S219. External monitors in place X2. Category I FHT at this time. VSS. Pt reports good FM. No complaints of contractions, ROM or vaginal bleeding. SVE /-2. FOB at bedside. POC discussed with pt and family members, all questions answered.     0640- Called to update [facility] of pt arrival, SVE, and status.     0700- Report given to Tisha OLEA. POC discussed.    Aziza Galeano (: 1960) is a 63 y.o. female, Established patient, Virtual Visit for evaluation of the following chief complaint(s):   Results       ASSESSMENT/PLAN:  1. Type 2 diabetes mellitus with other diabetic kidney complication (HCC)  Fair control, continue with current regimen and consider adding Farxiga when able.  2. Coronary artery disease involving native coronary artery of native heart without angina pectoris  Stable, her lipids are much better than prior on maximum statin therapy.  Continue with current regimen.    Return for follow up as scheduled.    SUBJECTIVE/OBJECTIVE:  VV for follow up on results.  DM: Patient is taking Amaryl 4 mg and Metformin 1000mg BID regularly.   Her diet varies.  Denies hypoglycemic spells.  Not checking BG.  Rybelsus PAP denied.  CAD:  NSTEMI 2023.  Pt is taking ASA, Toprol XL (recently restarted), Lipitor.  Patient is able to walk a flight of stairs without any chest pain or PEREZ.  Cath 7/10/2023: diffuse CAD with 70% branch vessel.  Medical tx recommended.  Psoriasis:  Continues with generalized rash, saw Dermatology and confirmed Dx of psoriasis.  Recently started Skyrizi without any SE.  Rash started on lower abdomen 2023 and then pruritic papules/plaques on legs 2024.  It spread to arms and scalp 3/2024.    Review of Systems     Patient-Reported Vitals  No data recorded     Physical Exam    On this date 9/3/2024 I have spent 12 minutes reviewing previous notes, test results and face to face (virtual) with the patient discussing the diagnosis and importance of compliance with the treatment plan as well as documenting on the day of the visit.  Aziza Galeano, was evaluated through a synchronous (real-time) audio-video encounter. The patient (or guardian if applicable) is aware that this is a billable service, which includes applicable co-pays. This Virtual Visit was conducted with patient's (and/or legal guardian's) consent. Patient identification was

## 2024-09-03 NOTE — PROGRESS NOTES
Chief Complaint   Patient presents with    Results     There were no vitals taken for this visit.    \"Have you been to the ER, urgent care clinic since your last visit?  Hospitalized since your last visit?\"    NO    “Have you seen or consulted any other health care providers outside of [facility] since your last visit?”    NO     “Have you had a pap smear?”    NO    Date of last Cervical Cancer screen (HPV or PAP): 12/20/2016         “Have you had a colorectal cancer screening such as a colonoscopy/FIT/Cologuard?    NO    No colonoscopy on file  No cologuard on file  Date of last FIT: 1/19/2021   No flexible sigmoidoscopy on file         Click Here for Release of Records Request

## 2024-10-30 DIAGNOSIS — E11.29 TYPE 2 DIABETES MELLITUS WITH OTHER DIABETIC KIDNEY COMPLICATION (HCC): ICD-10-CM

## 2024-11-26 RX ORDER — GLIMEPIRIDE 4 MG/1
4 TABLET ORAL
Qty: 90 TABLET | Refills: 1 | OUTPATIENT
Start: 2024-11-26

## 2024-12-02 DIAGNOSIS — E11.29 TYPE 2 DIABETES MELLITUS WITH OTHER DIABETIC KIDNEY COMPLICATION (HCC): ICD-10-CM

## 2024-12-02 DIAGNOSIS — I25.2 HISTORY OF NON-ST ELEVATION MYOCARDIAL INFARCTION (NSTEMI): ICD-10-CM

## 2024-12-10 ENCOUNTER — OFFICE VISIT (OUTPATIENT)
Age: 64
End: 2024-12-10

## 2024-12-10 ENCOUNTER — HOSPITAL ENCOUNTER (OUTPATIENT)
Facility: HOSPITAL | Age: 64
Setting detail: SPECIMEN
Discharge: HOME | End: 2024-12-13

## 2024-12-10 VITALS
WEIGHT: 205 LBS | OXYGEN SATURATION: 98 % | HEART RATE: 70 BPM | SYSTOLIC BLOOD PRESSURE: 139 MMHG | BODY MASS INDEX: 35.19 KG/M2 | TEMPERATURE: 97.3 F | DIASTOLIC BLOOD PRESSURE: 72 MMHG

## 2024-12-10 DIAGNOSIS — E11.29 TYPE 2 DIABETES MELLITUS WITH OTHER DIABETIC KIDNEY COMPLICATION (HCC): Primary | ICD-10-CM

## 2024-12-10 DIAGNOSIS — E78.00 PURE HYPERCHOLESTEROLEMIA, UNSPECIFIED: ICD-10-CM

## 2024-12-10 DIAGNOSIS — I10 ESSENTIAL (PRIMARY) HYPERTENSION: ICD-10-CM

## 2024-12-10 DIAGNOSIS — I25.2 HISTORY OF NON-ST ELEVATION MYOCARDIAL INFARCTION (NSTEMI): ICD-10-CM

## 2024-12-10 LAB — GLUCOSE, POC: NORMAL MG/DL

## 2024-12-10 PROCEDURE — 3051F HG A1C>EQUAL 7.0%<8.0%: CPT | Performed by: NURSE PRACTITIONER

## 2024-12-10 PROCEDURE — 83036 HEMOGLOBIN GLYCOSYLATED A1C: CPT

## 2024-12-10 PROCEDURE — 3078F DIAST BP <80 MM HG: CPT | Performed by: NURSE PRACTITIONER

## 2024-12-10 PROCEDURE — 99213 OFFICE O/P EST LOW 20 MIN: CPT | Performed by: NURSE PRACTITIONER

## 2024-12-10 PROCEDURE — 82962 GLUCOSE BLOOD TEST: CPT | Performed by: NURSE PRACTITIONER

## 2024-12-10 PROCEDURE — 3075F SYST BP GE 130 - 139MM HG: CPT | Performed by: NURSE PRACTITIONER

## 2024-12-10 RX ORDER — AMLODIPINE AND BENAZEPRIL HYDROCHLORIDE 5; 20 MG/1; MG/1
1 CAPSULE ORAL DAILY
Qty: 30 CAPSULE | Refills: 11 | Status: SHIPPED | OUTPATIENT
Start: 2024-12-10

## 2024-12-10 RX ORDER — GLIMEPIRIDE 4 MG/1
4 TABLET ORAL
Qty: 90 TABLET | Refills: 1 | OUTPATIENT
Start: 2024-12-10

## 2024-12-10 RX ORDER — ATORVASTATIN CALCIUM 80 MG/1
80 TABLET, FILM COATED ORAL NIGHTLY
Qty: 30 TABLET | Refills: 11 | Status: SHIPPED | OUTPATIENT
Start: 2024-12-10

## 2024-12-10 RX ORDER — GLIMEPIRIDE 4 MG/1
4 TABLET ORAL
Qty: 30 TABLET | Refills: 11 | Status: SHIPPED | OUTPATIENT
Start: 2024-12-10

## 2024-12-10 RX ORDER — ASPIRIN 81 MG
TABLET,CHEWABLE ORAL
Qty: 90 TABLET | Refills: 3 | OUTPATIENT
Start: 2024-12-10

## 2024-12-10 RX ORDER — ASPIRIN 81 MG/1
81 TABLET, CHEWABLE ORAL DAILY
Qty: 90 TABLET | Refills: 3 | Status: SHIPPED | OUTPATIENT
Start: 2024-12-10

## 2024-12-10 RX ORDER — METOPROLOL SUCCINATE 50 MG/1
50 TABLET, EXTENDED RELEASE ORAL DAILY
Qty: 30 TABLET | Refills: 11 | Status: SHIPPED | OUTPATIENT
Start: 2024-12-10

## 2024-12-10 ASSESSMENT — PATIENT HEALTH QUESTIONNAIRE - PHQ9
SUM OF ALL RESPONSES TO PHQ QUESTIONS 1-9: 0
SUM OF ALL RESPONSES TO PHQ QUESTIONS 1-9: 0
SUM OF ALL RESPONSES TO PHQ9 QUESTIONS 1 & 2: 0
SUM OF ALL RESPONSES TO PHQ QUESTIONS 1-9: 0
1. LITTLE INTEREST OR PLEASURE IN DOING THINGS: NOT AT ALL
2. FEELING DOWN, DEPRESSED OR HOPELESS: NOT AT ALL
SUM OF ALL RESPONSES TO PHQ QUESTIONS 1-9: 0

## 2024-12-10 NOTE — PROGRESS NOTES
Results for orders placed or performed in visit on 12/10/24   AMB POC GLUCOSE BLOOD, BY GLUCOSE MONITORING DEVICE   Result Value Ref Range    Glucose,  nf MG/DL

## 2024-12-10 NOTE — PROGRESS NOTES
Patient discharged with the After Visit Summary. Patient's name and  verified. Patient made aware of the prescriptions sent to the pharmacy. Medication review completed. Pharmacy coupons given. The patient was instructed to report to the [place] laboratory prior to leaving the clinical site to have ordered lab drawn. The patient was also instructed on when to schedule the next appointment. A [facility] application was given to the patient per provider's request. She was instructed to return the application to the [facility] once completed. Patient given an opportunity to voice questions/concerns. All questions addressed to the patient's satisfaction.

## 2024-12-10 NOTE — PROGRESS NOTES
12/10/2024 11:29 AM ROBERTO CARLOS Galeano (: 1960) is a 63 y.o. female, established patient, here for evaluation of the following chief complaint(s):  Diabetes (Follow up), Hypertension, and Medication Refill  ASSESSMENT/PLAN: She will research Canagliflozin from [employer] and will decide by follow up if she would like to pursue this.  BP controlled.  Diabetes of uncertain control.  Below is the assessment and plan developed based on review of pertinent history, physical exam, labs, studies, and medications.   1. Type 2 diabetes mellitus with other diabetic kidney complication (HCC)  -     AMB POC GLUCOSE BLOOD, BY GLUCOSE MONITORING DEVICE  -     glimepiride (AMARYL) 4 MG tablet; Take 1 tablet by mouth every morning (before breakfast), Disp-30 tablet, R-11Normal  -     metFORMIN (GLUCOPHAGE) 1000 MG tablet; Take 1 tablet by mouth 2 times daily (with meals), Disp-60 tablet, R-11Normal  -     Hemoglobin A1C; Future  2. Essential (primary) hypertension  -     amLODIPine-benazepril (LOTREL) 5-20 MG per capsule; Take 1 capsule by mouth daily, Disp-30 capsule, R-11Normal  3. History of non-ST elevation myocardial infarction (NSTEMI)  -     aspirin 81 MG chewable tablet; Take 1 tablet by mouth daily, Disp-90 tablet, R-3Normal  4. Pure hypercholesterolemia, unspecified  -     atorvastatin (LIPITOR) 80 MG tablet; Take 1 tablet by mouth nightly, Disp-30 tablet, R-11Normal  5. Body mass index (BMI) 35.0-35.9, adult  STARTED SKYRIZI IN AUGUST, NOT USING ANY CREAMS.  Return for VV lab results 3-4 weeks.  SUBJECTIVE/OBJECTIVE:  HPI Feeling better.  Review of Systems Negative for fever and unexpected weight change, shortness of breath, leg swelling, abdominal pain, myalgias, dizziness. Negative for polyuria/polydipsia, chest pain, dyspnea, TIA's, numbness/tingling/pain in extremities, unusual visual symptoms, hypoglycemia symptoms, medication side effects.  Results for orders placed or performed in visit on 12/10/24   AMB

## 2024-12-11 LAB
EST. AVERAGE GLUCOSE BLD GHB EST-MCNC: 226 MG/DL
HBA1C MFR BLD: 9.5 % (ref 4–5.6)

## 2025-01-06 ENCOUNTER — TELEMEDICINE (OUTPATIENT)
Age: 65
End: 2025-01-06

## 2025-01-06 DIAGNOSIS — E55.9 VITAMIN D DEFICIENCY: ICD-10-CM

## 2025-01-06 DIAGNOSIS — E11.29 TYPE 2 DIABETES MELLITUS WITH OTHER DIABETIC KIDNEY COMPLICATION (HCC): Primary | ICD-10-CM

## 2025-01-06 DIAGNOSIS — E78.00 PURE HYPERCHOLESTEROLEMIA, UNSPECIFIED: ICD-10-CM

## 2025-01-06 DIAGNOSIS — Z12.31 ENCOUNTER FOR SCREENING MAMMOGRAM FOR MALIGNANT NEOPLASM OF BREAST: ICD-10-CM

## 2025-01-06 DIAGNOSIS — I10 ESSENTIAL (PRIMARY) HYPERTENSION: ICD-10-CM

## 2025-01-06 DIAGNOSIS — I25.10 CORONARY ARTERY DISEASE INVOLVING NATIVE CORONARY ARTERY OF NATIVE HEART WITHOUT ANGINA PECTORIS: ICD-10-CM

## 2025-01-06 SDOH — ECONOMIC STABILITY: FOOD INSECURITY: WITHIN THE PAST 12 MONTHS, YOU WORRIED THAT YOUR FOOD WOULD RUN OUT BEFORE YOU GOT MONEY TO BUY MORE.: NEVER TRUE

## 2025-01-06 SDOH — ECONOMIC STABILITY: FOOD INSECURITY: WITHIN THE PAST 12 MONTHS, THE FOOD YOU BOUGHT JUST DIDN'T LAST AND YOU DIDN'T HAVE MONEY TO GET MORE.: NEVER TRUE

## 2025-01-06 SDOH — ECONOMIC STABILITY: INCOME INSECURITY: HOW HARD IS IT FOR YOU TO PAY FOR THE VERY BASICS LIKE FOOD, HOUSING, MEDICAL CARE, AND HEATING?: NOT HARD AT ALL

## 2025-01-06 ASSESSMENT — PATIENT HEALTH QUESTIONNAIRE - PHQ9
1. LITTLE INTEREST OR PLEASURE IN DOING THINGS: NOT AT ALL
SUM OF ALL RESPONSES TO PHQ9 QUESTIONS 1 & 2: 0
SUM OF ALL RESPONSES TO PHQ QUESTIONS 1-9: 0
2. FEELING DOWN, DEPRESSED OR HOPELESS: NOT AT ALL

## 2025-01-06 ASSESSMENT — LIFESTYLE VARIABLES
HOW MANY STANDARD DRINKS CONTAINING ALCOHOL DO YOU HAVE ON A TYPICAL DAY: PATIENT DOES NOT DRINK
HOW OFTEN DO YOU HAVE A DRINK CONTAINING ALCOHOL: NEVER

## 2025-01-06 NOTE — PROGRESS NOTES
\"Have you been to the ER, urgent care clinic since your last visit?  Hospitalized since your last visit?\"    NO    “Have you seen or consulted any other health care providers outside our system since your last visit?”    NO     “Have you had a pap smear?”    NO    Date of last Cervical Cancer screen (HPV or PAP): 12/20/2016       “Have you had a colorectal cancer screening such as a colonoscopy/FIT/Cologuard?    NO    No colonoscopy on file  No cologuard on file  Date of last FIT: 1/19/2021   No flexible sigmoidoscopy on file     “Have you had a diabetic eye exam?”    NO, got an eye exam early 2024     Date of last diabetic eye exam: 8/29/2014          Chief Complaint   Patient presents with    Discuss Labs    Discuss Medications     Pt would like to talk about trying different supplements/ natural remedies    Breast Pain     Pt c/o pain on L breast pain that comes and goes, reports some breast tissue thickness and tenderness at times     Patient reported BP: 149/90 FAHAD, 146/86 JUANCHO

## 2025-01-06 NOTE — PROGRESS NOTES
Pt's name and  verified. AVS reviewed. Central Scheduling number provided and pt instructed to call to schedule mammogram. Pt verbalizes understanding. Pt also instructed to schedule follow up in 3 months per provider. Time allowed for questions, all questions answered.  Pat Elizabeth RN

## 2025-01-06 NOTE — PROGRESS NOTES
Aziza Galeano (: 1960) is a 64 y.o. female, Established patient, Virtual Visit for evaluation of the following chief complaint(s):   Discuss Labs, Discuss Medications (Pt would like to talk about trying different supplements/ natural remedies), and Breast Pain (Pt c/o pain on L breast pain that comes and goes, reports some breast tissue thickness and tenderness at times)       ASSESSMENT/PLAN:  1. Type 2 diabetes mellitus with other diabetic kidney complication (HCC)  Not controlled, pt admits to poor diet during the holidays.  She will improved diet.  She has question about supplements for diabetes and reviewed their use and mild effects.  Diet, exercise, weight loss are the most natural improvements for diabetic control.  Pt agrees and will recheck labs in 3 months.  2. Essential (primary) hypertension  Variable but fair control on last visit.  3. Pure hypercholesterolemia, unspecified  Controlled, continue with Lipitor.  4. Coronary artery disease involving native coronary artery of native heart without angina pectoris  No new persisting sx, continue current regimen.  5. Vitamin D deficiency  Continue with supplement.  6. Encounter for screening mammogram for malignant neoplasm of breast  -     [facility] ESTRELLITA DIGITAL SCREEN BILATERAL; Future  She is due for screening mammogram in Feb, order entered.    Return for follow up F2F in 3 months .    SUBJECTIVE/OBJECTIVE:  VV for follow up on DM.  HTN:  Patient is taking Lotrel regularly.  DM: Patient is taking Amaryl 4 mg and Metformin 1000 mg BID regularly.  Her diet varies.  Denies hypoglycemic spells.  Not checking BG.  Rybelsus PAP denied.  CAD:  NSTEMI 2023.  Pt is taking ASA, Toprol XL, Lipitor.  Patient is able to walk a flight of stairs without any chest pain or PEREZ.  Denies any new chest pain.  Cath 7/10/2023: diffuse CAD with 70% branch vessel.  Medical tx recommended.  HLD: Taking Lipitor 80mg daily.  Untreated LDL of 205.  Vitamin D deficiency:  She

## 2025-01-19 DIAGNOSIS — E11.29 TYPE 2 DIABETES MELLITUS WITH OTHER DIABETIC KIDNEY COMPLICATION (HCC): ICD-10-CM

## 2025-02-14 RX ORDER — GLIMEPIRIDE 4 MG/1
4 TABLET ORAL
Qty: 90 TABLET | Refills: 1 | Status: SHIPPED | OUTPATIENT
Start: 2025-02-14

## 2025-02-14 RX ORDER — ERGOCALCIFEROL 1.25 MG/1
50000 CAPSULE, LIQUID FILLED ORAL WEEKLY
Qty: 12 CAPSULE | Refills: 1 | Status: SHIPPED | OUTPATIENT
Start: 2025-02-14

## 2025-02-14 NOTE — TELEPHONE ENCOUNTER
Chief Complaint   Patient presents with    Medication Refill     Vitamin D 50,000 units     Patient is scheduled for 04/03/2025 for follow up.  Patient last visit office visit 12/2024 with labs completed and virtual on 01/06/2025.  Caroline Canales LPN

## 2025-02-14 NOTE — TELEPHONE ENCOUNTER
Chief Complaint   Patient presents with    Medication Refill     Glimepiride 4 mg tablet      Patient is scheduled for 4/3/2025 for follow up care.  Patient last office visit was 12/2024 with labs completed  and virtual 01/06/2025.  Caroline Canales LPN

## 2025-04-03 ENCOUNTER — OFFICE VISIT (OUTPATIENT)
Age: 65
End: 2025-04-03

## 2025-04-03 ENCOUNTER — HOSPITAL ENCOUNTER (OUTPATIENT)
Facility: HOSPITAL | Age: 65
Setting detail: SPECIMEN
Discharge: HOME | End: 2025-04-06

## 2025-04-03 VITALS
OXYGEN SATURATION: 96 % | DIASTOLIC BLOOD PRESSURE: 84 MMHG | TEMPERATURE: 98.8 F | WEIGHT: 210 LBS | SYSTOLIC BLOOD PRESSURE: 142 MMHG | BODY MASS INDEX: 36.05 KG/M2 | HEART RATE: 71 BPM

## 2025-04-03 DIAGNOSIS — L40.9 PSORIASIS: ICD-10-CM

## 2025-04-03 DIAGNOSIS — E11.29 TYPE 2 DIABETES MELLITUS WITH OTHER DIABETIC KIDNEY COMPLICATION: Primary | ICD-10-CM

## 2025-04-03 DIAGNOSIS — E11.29 TYPE 2 DIABETES MELLITUS WITH OTHER DIABETIC KIDNEY COMPLICATION: ICD-10-CM

## 2025-04-03 DIAGNOSIS — E78.00 PURE HYPERCHOLESTEROLEMIA, UNSPECIFIED: ICD-10-CM

## 2025-04-03 DIAGNOSIS — I10 ESSENTIAL (PRIMARY) HYPERTENSION: ICD-10-CM

## 2025-04-03 DIAGNOSIS — Z13.9 ENCOUNTER FOR SCREENING: ICD-10-CM

## 2025-04-03 DIAGNOSIS — I25.10 CORONARY ARTERY DISEASE INVOLVING NATIVE CORONARY ARTERY OF NATIVE HEART WITHOUT ANGINA PECTORIS: ICD-10-CM

## 2025-04-03 DIAGNOSIS — Z23 NEED FOR PROPHYLACTIC VACCINATION AGAINST STREPTOCOCCUS PNEUMONIAE (PNEUMOCOCCUS): ICD-10-CM

## 2025-04-03 LAB
ALBUMIN SERPL-MCNC: 3.9 G/DL (ref 3.5–5)
ALBUMIN/GLOB SERPL: 1.3 (ref 1.1–2.2)
ALP SERPL-CCNC: 120 U/L (ref 45–117)
ALT SERPL-CCNC: 30 U/L (ref 12–78)
ANION GAP SERPL CALC-SCNC: 5 MMOL/L (ref 2–12)
AST SERPL-CCNC: 27 U/L (ref 15–37)
BILIRUB SERPL-MCNC: 0.5 MG/DL (ref 0.2–1)
BUN SERPL-MCNC: 11 MG/DL (ref 6–20)
BUN/CREAT SERPL: 15 (ref 12–20)
CALCIUM SERPL-MCNC: 9.5 MG/DL (ref 8.5–10.1)
CHLORIDE SERPL-SCNC: 104 MMOL/L (ref 97–108)
CHOLEST SERPL-MCNC: 182 MG/DL
CO2 SERPL-SCNC: 25 MMOL/L (ref 21–32)
CREAT SERPL-MCNC: 0.73 MG/DL (ref 0.55–1.02)
CREAT UR-MCNC: 147 MG/DL
ERYTHROCYTE [DISTWIDTH] IN BLOOD BY AUTOMATED COUNT: 12.6 % (ref 11.5–14.5)
EST. AVERAGE GLUCOSE BLD GHB EST-MCNC: 220 MG/DL
GLOBULIN SER CALC-MCNC: 2.9 G/DL (ref 2–4)
GLUCOSE SERPL-MCNC: 203 MG/DL (ref 65–100)
GLUCOSE, POC: 208 MG/DL
HBA1C MFR BLD: 9.3 % (ref 4–5.6)
HCT VFR BLD AUTO: 41.3 % (ref 35–47)
HDLC SERPL-MCNC: 49 MG/DL
HDLC SERPL: 3.7 (ref 0–5)
HEMOGLOBIN, POC: 13.1 G/DL
HGB BLD-MCNC: 13.2 G/DL (ref 11.5–16)
LDLC SERPL CALC-MCNC: 106 MG/DL (ref 0–100)
MCH RBC QN AUTO: 26.5 PG (ref 26–34)
MCHC RBC AUTO-ENTMCNC: 32 G/DL (ref 30–36.5)
MCV RBC AUTO: 82.8 FL (ref 80–99)
MICROALBUMIN UR-MCNC: 13.5 MG/DL
MICROALBUMIN/CREAT UR-RTO: 92 MG/G (ref 0–30)
NRBC # BLD: 0 K/UL (ref 0–0.01)
NRBC BLD-RTO: 0 PER 100 WBC
PLATELET # BLD AUTO: 340 K/UL (ref 150–400)
PMV BLD AUTO: 10.1 FL (ref 8.9–12.9)
POTASSIUM SERPL-SCNC: 4.1 MMOL/L (ref 3.5–5.1)
PROT SERPL-MCNC: 6.8 G/DL (ref 6.4–8.2)
RBC # BLD AUTO: 4.99 M/UL (ref 3.8–5.2)
SODIUM SERPL-SCNC: 134 MMOL/L (ref 136–145)
TRIGL SERPL-MCNC: 135 MG/DL
VLDLC SERPL CALC-MCNC: 27 MG/DL
WBC # BLD AUTO: 5.3 K/UL (ref 3.6–11)

## 2025-04-03 PROCEDURE — 90471 IMMUNIZATION ADMIN: CPT | Performed by: FAMILY MEDICINE

## 2025-04-03 PROCEDURE — 85018 HEMOGLOBIN: CPT | Performed by: FAMILY MEDICINE

## 2025-04-03 PROCEDURE — 90677 PCV20 VACCINE IM: CPT | Performed by: FAMILY MEDICINE

## 2025-04-03 PROCEDURE — 3077F SYST BP >= 140 MM HG: CPT | Performed by: FAMILY MEDICINE

## 2025-04-03 PROCEDURE — 82962 GLUCOSE BLOOD TEST: CPT | Performed by: FAMILY MEDICINE

## 2025-04-03 PROCEDURE — 83036 HEMOGLOBIN GLYCOSYLATED A1C: CPT

## 2025-04-03 PROCEDURE — 82570 ASSAY OF URINE CREATININE: CPT

## 2025-04-03 PROCEDURE — 82043 UR ALBUMIN QUANTITATIVE: CPT

## 2025-04-03 PROCEDURE — 90750 HZV VACC RECOMBINANT IM: CPT | Performed by: FAMILY MEDICINE

## 2025-04-03 PROCEDURE — 80053 COMPREHEN METABOLIC PANEL: CPT

## 2025-04-03 PROCEDURE — 80061 LIPID PANEL: CPT

## 2025-04-03 PROCEDURE — 90472 IMMUNIZATION ADMIN EACH ADD: CPT | Performed by: FAMILY MEDICINE

## 2025-04-03 PROCEDURE — 99214 OFFICE O/P EST MOD 30 MIN: CPT | Performed by: FAMILY MEDICINE

## 2025-04-03 PROCEDURE — 3079F DIAST BP 80-89 MM HG: CPT | Performed by: FAMILY MEDICINE

## 2025-04-03 PROCEDURE — 85027 COMPLETE CBC AUTOMATED: CPT

## 2025-04-03 ASSESSMENT — PATIENT HEALTH QUESTIONNAIRE - PHQ9
2. FEELING DOWN, DEPRESSED OR HOPELESS: NOT AT ALL
SUM OF ALL RESPONSES TO PHQ QUESTIONS 1-9: 0
1. LITTLE INTEREST OR PLEASURE IN DOING THINGS: NOT AT ALL

## 2025-04-03 ASSESSMENT — ENCOUNTER SYMPTOMS
SHORTNESS OF BREATH: 0
COUGH: 0

## 2025-04-03 NOTE — PROGRESS NOTES
Chief Complaint   Patient presents with    Diabetes     3 month f/u    Immunizations     SHINGRIX -SHINGLES #1 AND PREVNAR 20 ( PNEUMOCOCCAL).        Patient name and date of birth verified.  Caroline Canales LPN    Patient given an VIS for Pneumococcal 20 and Shingles #1 and information reviewed of the common side effects/reactions ( redness and soreness ) at injection site, mild body aches /fever .  Emergent care side effects/reactions are full body rash, difficulty breathing, swelling of face, lips and throat  and full body weakness.  Advised to remain in the clinic office for 15 minutes for observation of any side effects/reactions.  Patient verbalized understanding of all information given.  No know reactions noted.  Caroline Canales LPN

## 2025-04-03 NOTE — PROGRESS NOTES
Aziza Galeano was seen at discharge. Patient verified their full name and . After visit Summary provided and reviewed with patient. RN advised patient when provider recommends to return for follow-up Virtual visit in 1 week and 6 months follow up. RN reviewed the provider's instructions with the patient, including medication instructions. Patient verbalized her understanding and denies having any further questions at this time. Patient was advised that she will be contacted by a [facility] Coordinator in regards to her  referral. Patient was notified that [employer] has its own financial screening. Provided patient with information pamphlet and phone # for [employer].  Explained that they will do a financial screening when they call before scheduling for an appointment. I explained to the patient that they will need to tell screener how many in the household are working and how much they are earning. she was instructed to call and set up an appointment at her earliest convenience and that the program has its own financial screening and requirements. Patient was provided with the PAP application for Ozempic.   Mark Rosales RN

## 2025-04-03 NOTE — PROGRESS NOTES
Had central dull chest pain earlier in the week; stopped after 1hr. Came out of nowhere, she was resting at the time. It was a dull pain. Had L arm pain yesterday as well with stiffness and dull ache. Did not radiate into neck/jaw and fingers.     Needs ASA.    Not feeling low BG. Don't check BG, doesn't have strips.   Diet: oatmeal, bagels, cream cheese, salads, cutting back on meats, chicken/fish/goat rarely, trying to limit sweets/carbs, fruits, snacks: guac, corn chips, tortillas, salsa, cheese  Activity: [employer] water aerobics 2x/wk, stretching, yard work, yoga    Rash on arms/leg

## 2025-04-03 NOTE — PROGRESS NOTES
Aziza Galeano (: 1960) is a 64 y.o. female, Established patient, here for evaluation of the following chief complaint(s):  Diabetes (3 month f/u)       ASSESSMENT/PLAN:  1. Type 2 diabetes mellitus with other diabetic kidney complication  Not controlled, and she has increased in weight since last visit.  Will try for Ozempic through PAP for DM and CAD.  -     AMB POC GLUCOSE BLOOD, BY GLUCOSE MONITORING DEVICE  -     CBC; Future  -     Comprehensive Metabolic Panel; Future  -     Hemoglobin A1C; Future  -     Albumin/Creatinine Ratio, Urine; Future  2. Essential (primary) hypertension  Mildly elevated, check home readings and consider increasing Toprol XL  3. Pure hypercholesterolemia, unspecified  Patient is taking Lipitor regularly.  -     Lipid Panel; Future  4. Coronary artery disease involving native coronary artery of native heart without angina pectoris  Stable but with some chest tightness which is brief and not exertional.  Continue with current regimen.  If sx worsen recommend follow up with Cardiology.   5. Psoriasis  Controlled, continue with current tx.  She needs renewal of Derm referral.  -     External Referral To Dermatology  6. Encounter for screening  -     AMB POC HEMOGLOBIN (HGB)  7. Need for prophylactic vaccination against Streptococcus pneumoniae (pneumococcus)  -     Pneumococcal, PCV20, PREVNAR 20, (age 18 yrs+), IM, PF  Will give Prevnar and Shingles today.    Return for follow up F2F in 6 months for DM, VV in 1 week for results.    SUBJECTIVE:  Follow up for DM, CAD  CAD:  NSTEMI 2023.  Pt is taking ASA, Toprol XL, Lipitor.  Is doing water arobics 2x/wk without any chest pain.  Did note 2 spells of dull chest ache in the past week.  It improved with her taking a deep breath and relaxing.  She was not exerting herself at the time.  She has not seen Cardiology since our last visit.  Cath 7/10/2023: diffuse CAD with 70% branch vessel.  Medical tx recommended.  HTN:  Patient is

## 2025-04-03 NOTE — PROGRESS NOTES
\"Have you been to the ER, urgent care clinic since your last visit?  Hospitalized since your last visit?\"    NO    “Have you seen or consulted any other health care providers outside our system since your last visit?”    NO    Have you had a mammogram?”   yes    Date of last Mammogram: 2/26/2024      “Have you had a pap smear?”    NO    Date of last Cervical Cancer screen (HPV or PAP): 12/20/2016       “Have you had a colorectal cancer screening such as a colonoscopy/FIT/Cologuard?    NO    No colonoscopy on file  No cologuard on file  Date of last FIT: 1/19/2021   No flexible sigmoidoscopy on file     “Have you had a diabetic eye exam?”    NO     Date of last diabetic eye exam: 8/29/2014          Results for orders placed or performed in visit on 04/03/25   AMB POC GLUCOSE BLOOD, BY GLUCOSE MONITORING DEVICE   Result Value Ref Range    Glucose,  MG/DL   AMB POC HEMOGLOBIN (HGB)   Result Value Ref Range    Hemoglobin, POC 13.1 G/DL     Fasting

## 2025-04-14 ENCOUNTER — TELEMEDICINE (OUTPATIENT)
Age: 65
End: 2025-04-14

## 2025-04-14 DIAGNOSIS — E11.29 TYPE 2 DIABETES MELLITUS WITH OTHER DIABETIC KIDNEY COMPLICATION: ICD-10-CM

## 2025-04-14 DIAGNOSIS — I10 ESSENTIAL (PRIMARY) HYPERTENSION: ICD-10-CM

## 2025-04-14 DIAGNOSIS — I25.10 CORONARY ARTERY DISEASE INVOLVING NATIVE CORONARY ARTERY OF NATIVE HEART WITHOUT ANGINA PECTORIS: Primary | ICD-10-CM

## 2025-04-14 DIAGNOSIS — E78.00 PURE HYPERCHOLESTEROLEMIA, UNSPECIFIED: ICD-10-CM

## 2025-04-14 PROCEDURE — 99214 OFFICE O/P EST MOD 30 MIN: CPT | Performed by: FAMILY MEDICINE

## 2025-04-14 NOTE — PROGRESS NOTES
Chief Complaint   Patient presents with    Discuss Labs     Labs completed on 04/03/2025       1. Have you been to the ER, urgent care clinic since your last visit?  Hospitalized since your last visit?No    2. Have you seen or consulted any other health care providers outside of the [facility] since your last visit?  Include any pap smears or colon screening. No

## 2025-04-14 NOTE — PROGRESS NOTES
Chief Complaint   Patient presents with    Discuss Labs     Labs completed on 04/03/2025     Patient is to follow up in 3-4 months face to face and virtual in 2 weeks for blood pressure check.  Patient mailed an after visit summary.  Caroline Canales LPN

## 2025-04-14 NOTE — PROGRESS NOTES
Aziza Galeano (: 1960) is a 64 y.o. female, Established patient, Virtual Visit for evaluation of the following chief complaint(s):   Discuss Labs (Labs completed on 2025)       ASSESSMENT/PLAN:  1. Coronary artery disease involving native coronary artery of native heart without angina pectoris  With some chest pressure spells.  She has not checked her BP.  Recommend checking BP and increasing Toprol XL to 1.5 tab QD if BP is > 140/90.  Recheck in 2 weeks.  If sx continue in spite of better BP control will refer back to Cardiology.  2. Essential (primary) hypertension  Check BP at home (pt has monitor) and recheck in 2 weeks.  3. Pure hypercholesterolemia, unspecified  Uncontrolled, pt will cut out coconut oil and recheck  4. Type 2 diabetes mellitus with other diabetic kidney complication  Not controlled, we have applied for Ozempic.  If she is declined would pursue SGLT-2.    Return in about 3 months (around 2025) for follow up F2F in 3-4 months for DM, cholesterol check, BP check; VV 2 weeks for BP Check.    SUBJECTIVE/OBJECTIVE:  VV for follow up on DM and labs  CAD:  NSTEMI 2023.  Pt is taking ASA, Toprol XL, Lipitor.  Is doing water arobics 2x/wk without any chest pain.  Has noted 2-3 spells of dull chest ache.  Last one was 1 week ago at rest.  Was a ache in Lt axilla that resolved after 1 hour.  No diaphoresis or SOB.    She has not seen Cardiology since our last visit.  Cath 7/10/2023: diffuse CAD with 70% branch vessel.  Medical tx recommended.  HTN:  Patient is taking Lotrel, Toprol XL regularly.  Has not checked her BP since last visit.  DM: Patient is taking Amaryl 4 mg and Metformin 1000 mg BID regularly.  Her diet varies.  Doing water aerobics 2x/wk.  Denies hypoglycemic spells.  Not checking BG.  Loni PAP denied.  Filled out paperwork for Ozempic at last visit.  HLD: Taking Lipitor 80mg daily.  Untreated LDL of 205.  Diet: is avoiding red meats.  Has been using coconut oil

## 2025-04-16 ENCOUNTER — OFFICE VISIT (OUTPATIENT)
Age: 65
End: 2025-04-16

## 2025-04-16 DIAGNOSIS — Z71.89 COUNSELING AND COORDINATION OF CARE: Primary | ICD-10-CM

## 2025-04-28 ENCOUNTER — TELEMEDICINE (OUTPATIENT)
Age: 65
End: 2025-04-28

## 2025-04-28 DIAGNOSIS — I10 ESSENTIAL (PRIMARY) HYPERTENSION: Primary | ICD-10-CM

## 2025-04-28 PROCEDURE — 98016 BRIEF COMUNICAJ TECH-BSD SVC: CPT | Performed by: FAMILY MEDICINE

## 2025-04-28 RX ORDER — METOPROLOL SUCCINATE 50 MG/1
50 TABLET, EXTENDED RELEASE ORAL DAILY
Qty: 90 TABLET | Refills: 1 | OUTPATIENT
Start: 2025-04-28

## 2025-04-28 RX ORDER — METOPROLOL SUCCINATE 50 MG/1
75 TABLET, EXTENDED RELEASE ORAL DAILY
Qty: 45 TABLET | Refills: 11 | Status: SHIPPED | OUTPATIENT
Start: 2025-04-28

## 2025-04-28 SDOH — ECONOMIC STABILITY: FOOD INSECURITY: WITHIN THE PAST 12 MONTHS, YOU WORRIED THAT YOUR FOOD WOULD RUN OUT BEFORE YOU GOT MONEY TO BUY MORE.: NEVER TRUE

## 2025-04-28 SDOH — ECONOMIC STABILITY: FOOD INSECURITY: WITHIN THE PAST 12 MONTHS, THE FOOD YOU BOUGHT JUST DIDN'T LAST AND YOU DIDN'T HAVE MONEY TO GET MORE.: NEVER TRUE

## 2025-04-28 ASSESSMENT — PATIENT HEALTH QUESTIONNAIRE - PHQ9
SUM OF ALL RESPONSES TO PHQ QUESTIONS 1-9: 0
1. LITTLE INTEREST OR PLEASURE IN DOING THINGS: NOT AT ALL
2. FEELING DOWN, DEPRESSED OR HOPELESS: NOT AT ALL
SUM OF ALL RESPONSES TO PHQ QUESTIONS 1-9: 0

## 2025-04-28 NOTE — PROGRESS NOTES
Aziza Galeano (: 1960) is a 64 y.o. female, Established patient, Virtual Visit for evaluation of the following chief complaint(s):   Hypertension (Follow up)       ASSESSMENT/PLAN:  1. Essential (primary) hypertension  Increase Toprol XL to 1.5 tabs (75mg) daily and recheck in 2 weeks.    Return for follow up VV in 2 weeks for BP check.    SUBJECTIVE/OBJECTIVE:  VV for follow up on HTN  HTN:  Patient is taking Lotrel, Toprol XL regularly.  Home //83.  Has not increased the dose of Toprol.    PMHx  Psoriasis  CAD: NSTEMI 2023.  Cath 7/10/2023: diffuse CAD with 70% branch vessel.  Medical tx recommended.    Review of Systems     Patient-Reported Vitals  No data recorded     Physical Exam    On this date 2025 I have spent 6 reviewing previous notes, test results, and other pertinent medical information with the patient, discussing the diagnosis and importance of compliance with the treatment plan as well as documenting on the day of the visit.  Aziza Galeano, was evaluated through a synchronous (real-time) audio encounter. The patient (or guardian if applicable) is aware that this is a billable service, which includes applicable co-pays. This Virtual Visit was conducted with patient's (and/or legal guardian's) consent. Patient identification was verified, and a caregiver was present when appropriate.   The patient was located at Home: [address] [place] [phone]  Provider was located at Home (Appt Dept State): [place]  Confirm you are appropriately licensed, registered, or certified to deliver care in the state where the patient is located as indicated above. If you are not or unsure, please re-schedule the visit: Yes, I confirm.      Patient identification was verified at the start of the visit: yes    An electronic signature was used to authenticate this note.  -- Henry Todd MD

## 2025-04-28 NOTE — PROGRESS NOTES
Pt's name and  verified. AVS provided. Medication reviewed and education provided. Pt informed of virtual visit follow up in 2 weeks per Dr Todd. Pt scheduled 25 at 1245 for virtual visit with Dr. Todd. Pt in agreement with appointment date/time. Time allowed for questions, all questions answered. Pat Elizabeth RN

## 2025-05-12 ENCOUNTER — TELEMEDICINE (OUTPATIENT)
Age: 65
End: 2025-05-12

## 2025-05-12 DIAGNOSIS — I10 ESSENTIAL (PRIMARY) HYPERTENSION: Primary | ICD-10-CM

## 2025-05-12 PROCEDURE — 99212 OFFICE O/P EST SF 10 MIN: CPT | Performed by: FAMILY MEDICINE

## 2025-05-12 ASSESSMENT — PATIENT HEALTH QUESTIONNAIRE - PHQ9
SUM OF ALL RESPONSES TO PHQ QUESTIONS 1-9: 0
SUM OF ALL RESPONSES TO PHQ QUESTIONS 1-9: 0
1. LITTLE INTEREST OR PLEASURE IN DOING THINGS: NOT AT ALL
SUM OF ALL RESPONSES TO PHQ QUESTIONS 1-9: 0
2. FEELING DOWN, DEPRESSED OR HOPELESS: NOT AT ALL
SUM OF ALL RESPONSES TO PHQ QUESTIONS 1-9: 0

## 2025-05-12 NOTE — PROGRESS NOTES
Aziza Galeano (: 1960) is a 64 y.o. female, Established patient, Virtual Visit for evaluation of the following chief complaint(s):   Hypertension       ASSESSMENT/PLAN:  1. Essential (primary) hypertension  Not controlled, she plans on checking BP again this week and will increase Toprol XL to 100 mg daily if SBP remains > 140.  I recommended she also records HR to avoid bradycardia.    Return for follow up VV in 3 weeks for BP check.    SUBJECTIVE/OBJECTIVE:  VV for follow up on HTN.  HTN:  Patient is taking Lotrel, Toprol XL 1.5 tab regularly.  Home /77 this AM but that was after rushing around.  CAD:  NSTEMI 2023.  Pt is taking ASA, Toprol XL, Lipitor.  Is doing water arobics 2x/wk without any chest pain.  Had noted 2-3 spells of dull chest ache.  But not further spells since increasing her Toprol.  No diaphoresis or SOB.  She has not seen Cardiology since our last visit.  Cath 7/10/2023: diffuse CAD with 70% branch vessel.  Medical tx recommended.    PMHx  Psoriasis  CAD: NSTEMI 2023.  Cath 7/10/2023: diffuse CAD with 70% branch vessel.  Medical tx recommended.    Review of Systems     Patient-Reported Vitals  No data recorded     Physical Exam    On this date 2025 I have spent 11 reviewing previous notes, test results, and other pertinent medical information with the patient, discussing the diagnosis and importance of compliance with the treatment plan as well as documenting on the day of the visit.  Aziza Galeano, was evaluated through a synchronous (real-time) audio encounter. The patient (or guardian if applicable) is aware that this is a billable service, which includes applicable co-pays. This Virtual Visit was conducted with patient's (and/or legal guardian's) consent. Patient identification was verified, and a caregiver was present when appropriate.   The patient was located at Home: [address] [place] [phone]  Provider was located at Home (Appt Dept State):

## 2025-05-12 NOTE — PROGRESS NOTES
Have you been to the ER, urgent care clinic since your last visit?  Hospitalized since your last visit?   NO    Have you seen or consulted any other health care providers outside our system since your last visit?   NO    Have you had a mammogram?”   NO    Date of last Mammogram: 2/26/2024      “Have you had a pap smear?”    NO    Date of last Cervical Cancer screen (HPV or PAP): 12/20/2016       “Have you had a colorectal cancer screening such as a colonoscopy/FIT/Cologuard?    NO    No colonoscopy on file  No cologuard on file  Date of last FIT: 1/19/2021   No flexible sigmoidoscopy on file     “Have you had a diabetic eye exam?”    NO     Date of last diabetic eye exam: 8/29/2014

## 2025-06-02 ENCOUNTER — TELEMEDICINE (OUTPATIENT)
Age: 65
End: 2025-06-02

## 2025-06-02 DIAGNOSIS — I10 ESSENTIAL (PRIMARY) HYPERTENSION: Primary | ICD-10-CM

## 2025-06-02 PROCEDURE — 98016 BRIEF COMUNICAJ TECH-BSD SVC: CPT | Performed by: FAMILY MEDICINE

## 2025-06-02 SDOH — SOCIAL STABILITY: SOCIAL NETWORK: IN A TYPICAL WEEK, HOW MANY TIMES DO YOU TALK ON THE PHONE WITH FAMILY, FRIENDS, OR NEIGHBORS?: THREE TIMES A WEEK

## 2025-06-02 SDOH — SOCIAL STABILITY: SOCIAL INSECURITY: WITHIN THE LAST YEAR, HAVE YOU BEEN HUMILIATED OR EMOTIONALLY ABUSED IN OTHER WAYS BY YOUR PARTNER OR EX-PARTNER?: NO

## 2025-06-02 SDOH — HEALTH STABILITY: MENTAL HEALTH
DO YOU FEEL STRESS - TENSE, RESTLESS, NERVOUS, OR ANXIOUS, OR UNABLE TO SLEEP AT NIGHT BECAUSE YOUR MIND IS TROUBLED ALL THE TIME - THESE DAYS?: NOT AT ALL

## 2025-06-02 SDOH — SOCIAL STABILITY: SOCIAL INSECURITY: ARE YOU MARRIED, WIDOWED, DIVORCED, SEPARATED, NEVER MARRIED, OR LIVING WITH A PARTNER?: SEPARATED

## 2025-06-02 SDOH — HEALTH STABILITY: PHYSICAL HEALTH: ON AVERAGE, HOW MANY DAYS PER WEEK DO YOU ENGAGE IN MODERATE TO STRENUOUS EXERCISE (LIKE A BRISK WALK)?: 3 DAYS

## 2025-06-02 SDOH — ECONOMIC STABILITY: FOOD INSECURITY: HOW HARD IS IT FOR YOU TO PAY FOR THE VERY BASICS LIKE FOOD, HOUSING, MEDICAL CARE, AND HEATING?: NOT HARD AT ALL

## 2025-06-02 SDOH — HEALTH STABILITY: PHYSICAL HEALTH: ON AVERAGE, HOW MANY MINUTES DO YOU ENGAGE IN EXERCISE AT THIS LEVEL?: 60 MIN

## 2025-06-02 SDOH — SOCIAL STABILITY: SOCIAL INSECURITY: WITHIN THE LAST YEAR, HAVE YOU BEEN AFRAID OF YOUR PARTNER OR EX-PARTNER?: NO

## 2025-06-02 SDOH — HEALTH STABILITY: MENTAL HEALTH: HOW MANY DRINKS CONTAINING ALCOHOL DO YOU HAVE ON A TYPICAL DAY WHEN YOU ARE DRINKING?: PATIENT DOES NOT DRINK

## 2025-06-02 SDOH — ECONOMIC STABILITY: FOOD INSECURITY: WITHIN THE PAST 12 MONTHS, THE FOOD YOU BOUGHT JUST DIDN'T LAST AND YOU DIDN'T HAVE MONEY TO GET MORE.: NEVER TRUE

## 2025-06-02 SDOH — SOCIAL STABILITY: SOCIAL NETWORK: HOW OFTEN DO YOU ATTEND CHURCH OR RELIGIOUS SERVICES?: MORE THAN 4 TIMES PER YEAR

## 2025-06-02 SDOH — HEALTH STABILITY: MENTAL HEALTH: HOW OFTEN DO YOU HAVE A DRINK CONTAINING ALCOHOL?: NEVER

## 2025-06-02 SDOH — ECONOMIC STABILITY: HOUSING INSECURITY: IN THE LAST 12 MONTHS, WAS THERE A TIME WHEN YOU WERE NOT ABLE TO PAY THE MORTGAGE OR RENT ON TIME?: NO

## 2025-06-02 SDOH — SOCIAL STABILITY: SOCIAL NETWORK: HOW OFTEN DO YOU ATTEND MEETINGS OF THE CLUBS OR ORGANIZATIONS YOU BELONG TO?: NEVER

## 2025-06-02 SDOH — SOCIAL STABILITY: SOCIAL INSECURITY
WITHIN THE LAST YEAR, HAVE YOU BEEN KICKED, HIT, SLAPPED, OR OTHERWISE PHYSICALLY HURT BY YOUR PARTNER OR EX-PARTNER?: NO

## 2025-06-02 SDOH — SOCIAL STABILITY: SOCIAL NETWORK: HOW OFTEN DO YOU GET TOGETHER WITH FRIENDS OR RELATIVES?: THREE TIMES A WEEK

## 2025-06-02 SDOH — SOCIAL STABILITY: SOCIAL NETWORK
DO YOU BELONG TO ANY CLUBS OR ORGANIZATIONS SUCH AS CHURCH GROUPS, UNIONS, FRATERNAL OR ATHLETIC GROUPS, OR SCHOOL GROUPS?: NO

## 2025-06-02 SDOH — SOCIAL STABILITY: SOCIAL INSECURITY
WITHIN THE LAST YEAR, HAVE YOU BEEN RAPED OR FORCED TO HAVE ANY KIND OF SEXUAL ACTIVITY BY YOUR PARTNER OR EX-PARTNER?: NO

## 2025-06-02 SDOH — ECONOMIC STABILITY: FOOD INSECURITY: WITHIN THE PAST 12 MONTHS, YOU WORRIED THAT YOUR FOOD WOULD RUN OUT BEFORE YOU GOT THE MONEY TO BUY MORE.: NEVER TRUE

## 2025-06-02 SDOH — ECONOMIC STABILITY: TRANSPORTATION INSECURITY: IN THE PAST 12 MONTHS, HAS LACK OF TRANSPORTATION KEPT YOU FROM MEDICAL APPOINTMENTS OR FROM GETTING MEDICATIONS?: NO

## 2025-06-02 ASSESSMENT — PATIENT HEALTH QUESTIONNAIRE - PHQ9
SUM OF ALL RESPONSES TO PHQ QUESTIONS 1-9: 0
1. LITTLE INTEREST OR PLEASURE IN DOING THINGS: NOT AT ALL
SUM OF ALL RESPONSES TO PHQ QUESTIONS 1-9: 0
SUM OF ALL RESPONSES TO PHQ QUESTIONS 1-9: 0
2. FEELING DOWN, DEPRESSED OR HOPELESS: NOT AT ALL
SUM OF ALL RESPONSES TO PHQ QUESTIONS 1-9: 0

## 2025-06-02 ASSESSMENT — ACTIVITIES OF DAILY LIVING (ADL): LACK_OF_TRANSPORTATION: NO

## 2025-06-02 NOTE — PROGRESS NOTES
Aziza Galeano (: 1960) is a 64 y.o. female, Established patient, Virtual Visit for evaluation of the following chief complaint(s):   Hypertension (BP check/)       ASSESSMENT/PLAN:  1. Essential (primary) hypertension  Much better control, continue with current dose.      Return for follow up as scheduled.    SUBJECTIVE/OBJECTIVE:  VV for follow up on HTN  HTN:  Patient is taking Lotrel, Toprol XL 1.5 tab regularly.   CAD:  NSTEMI 2023.  Pt is taking ASA, Toprol XL, Lipitor.  Is doing water arobics 2x/wk without any chest pain.  Had noted 2-3 spells of dull chest ache.  But not further spells since increasing her Toprol.  No diaphoresis or SOB.  She has not seen Cardiology since our last visit.  Cath 7/10/2023: diffuse CAD with 70% branch vessel.  Medical tx recommended.    PMHx  Psoriasis  CAD: NSTEMI 2023.  Cath 7/10/2023: diffuse CAD with 70% branch vessel.  Medical tx recommended.    Review of Systems     Patient-Reported Vitals  Patient-Reported Systolic (Top): 125 mmHg  Patient-Reported Diastolic (Bottom): 83 mmHg  Patient-Reported Pulse: 80     Physical Exam    Aziza Galeano, was evaluated through a synchronous (real-time) audio encounter. The patient (or guardian if applicable) is aware that this is a billable service, which includes applicable co-pays. This Virtual Visit was conducted with patient's (and/or legal guardian's) consent. Patient identification was verified, and a caregiver was present when appropriate.   The patient was located at Home: [address] [place] [phone]  Provider was located at Home [place]  Confirm you are appropriately licensed, registered, or certified to deliver care in the state where the patient is located as indicated above. If you are not or unsure, please re-schedule the visit: Yes, I confirm.      Patient identification was verified at the start of the visit: yes  On this date 2025 I have spent 7 minutes reviewing previous

## (undated) DEVICE — GLIDESHEATH SLENDER ACCESS KIT: Brand: GLIDESHEATH SLENDER

## (undated) DEVICE — SUPPORT WRST AD W3.5XL9IN DIA14.5IN ART SFT ADJ HK AND LOOP

## (undated) DEVICE — CATHETER KIT JL4 JR4 5FR 100CM 145 PGTL DXTERITY

## (undated) DEVICE — HEART CATH-SFMC: Brand: MEDLINE INDUSTRIES, INC.

## (undated) DEVICE — ROSEN CURVED WIRE GUIDE: Brand: ROSEN

## (undated) DEVICE — BAND COMPR L24CM REG CLR PLAS HEMSTAT EXT HK AND LOOP RETEN